# Patient Record
Sex: FEMALE | Race: WHITE | NOT HISPANIC OR LATINO | Employment: OTHER | URBAN - METROPOLITAN AREA
[De-identification: names, ages, dates, MRNs, and addresses within clinical notes are randomized per-mention and may not be internally consistent; named-entity substitution may affect disease eponyms.]

---

## 2017-04-08 ENCOUNTER — HOSPITAL ENCOUNTER (EMERGENCY)
Facility: HOSPITAL | Age: 82
Discharge: HOME | End: 2017-04-08
Attending: EMERGENCY MEDICINE | Admitting: EMERGENCY MEDICINE

## 2017-04-08 ENCOUNTER — APPOINTMENT (OUTPATIENT)
Dept: CT IMAGING | Facility: HOSPITAL | Age: 82
End: 2017-04-08

## 2017-04-08 ENCOUNTER — APPOINTMENT (OUTPATIENT)
Dept: GENERAL RADIOLOGY | Facility: HOSPITAL | Age: 82
End: 2017-04-08

## 2017-04-08 VITALS
RESPIRATION RATE: 18 BRPM | BODY MASS INDEX: 23.92 KG/M2 | DIASTOLIC BLOOD PRESSURE: 86 MMHG | OXYGEN SATURATION: 93 % | TEMPERATURE: 97.9 F | WEIGHT: 130 LBS | SYSTOLIC BLOOD PRESSURE: 149 MMHG | HEIGHT: 62 IN | HEART RATE: 79 BPM

## 2017-04-08 DIAGNOSIS — S09.90XA CLOSED HEAD INJURY, INITIAL ENCOUNTER: Primary | ICD-10-CM
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
## 2017-04-08 DIAGNOSIS — I50.9 CONGESTIVE HEART FAILURE, UNSPECIFIED CONGESTIVE HEART FAILURE CHRONICITY, UNSPECIFIED CONGESTIVE HEART FAILURE TYPE: ICD-10-CM

## 2017-04-08 DIAGNOSIS — I50.9 CHRONIC CONGESTIVE HEART FAILURE, UNSPECIFIED CONGESTIVE HEART FAILURE TYPE: ICD-10-CM

## 2017-04-08 LAB
ALBUMIN SERPL-MCNC: 4.4 G/DL (ref 3.2–4.8)
ALBUMIN/GLOB SERPL: 1.2 G/DL (ref 1.5–2.5)
ALP SERPL-CCNC: 90 U/L (ref 25–100)
ALT SERPL W P-5'-P-CCNC: 12 U/L (ref 7–40)
ANION GAP SERPL CALCULATED.3IONS-SCNC: 8 MMOL/L (ref 3–11)
APTT PPP: 29.7 SECONDS (ref 24–31)
AST SERPL-CCNC: 30 U/L (ref 0–33)
BACTERIA UR QL AUTO: NORMAL /HPF
BASOPHILS # BLD AUTO: 0.05 10*3/MM3 (ref 0–0.2)
BASOPHILS NFR BLD AUTO: 0.6 % (ref 0–1)
BILIRUB SERPL-MCNC: 0.6 MG/DL (ref 0.3–1.2)
BILIRUB UR QL STRIP: NEGATIVE
BNP SERPL-MCNC: 826 PG/ML (ref 0–100)
BUN BLD-MCNC: 16 MG/DL (ref 9–23)
BUN/CREAT SERPL: 17.8 (ref 7–25)
CALCIUM SPEC-SCNC: 10.1 MG/DL (ref 8.7–10.4)
CHLORIDE SERPL-SCNC: 106 MMOL/L (ref 99–109)
CLARITY UR: CLEAR
CO2 SERPL-SCNC: 27 MMOL/L (ref 20–31)
COLOR UR: YELLOW
CREAT BLD-MCNC: 0.9 MG/DL (ref 0.6–1.3)
DEPRECATED RDW RBC AUTO: 50.5 FL (ref 37–54)
EOSINOPHIL # BLD AUTO: 0.61 10*3/MM3 (ref 0.1–0.3)
EOSINOPHIL NFR BLD AUTO: 7.1 % (ref 0–3)
ERYTHROCYTE [DISTWIDTH] IN BLOOD BY AUTOMATED COUNT: 15.8 % (ref 11.3–14.5)
GFR SERPL CREATININE-BSD FRML MDRD: 59 ML/MIN/1.73
GLOBULIN UR ELPH-MCNC: 3.7 GM/DL
GLUCOSE BLD-MCNC: 83 MG/DL (ref 70–100)
GLUCOSE UR STRIP-MCNC: NEGATIVE MG/DL
HCT VFR BLD AUTO: 40.6 % (ref 34.5–44)
HGB BLD-MCNC: 13 G/DL (ref 11.5–15.5)
HGB UR QL STRIP.AUTO: ABNORMAL
HYALINE CASTS UR QL AUTO: NORMAL /LPF
IMM GRANULOCYTES # BLD: 0.03 10*3/MM3 (ref 0–0.03)
IMM GRANULOCYTES NFR BLD: 0.3 % (ref 0–0.6)
INR PPP: 1.09
KETONES UR QL STRIP: NEGATIVE
LEUKOCYTE ESTERASE UR QL STRIP.AUTO: NEGATIVE
LYMPHOCYTES # BLD AUTO: 1.12 10*3/MM3 (ref 0.6–4.8)
LYMPHOCYTES NFR BLD AUTO: 13 % (ref 24–44)
MCH RBC QN AUTO: 28.4 PG (ref 27–31)
MCHC RBC AUTO-ENTMCNC: 32 G/DL (ref 32–36)
MCV RBC AUTO: 88.6 FL (ref 80–99)
MONOCYTES # BLD AUTO: 1.16 10*3/MM3 (ref 0–1)
MONOCYTES NFR BLD AUTO: 13.5 % (ref 0–12)
NEUTROPHILS # BLD AUTO: 5.65 10*3/MM3 (ref 1.5–8.3)
NEUTROPHILS NFR BLD AUTO: 65.5 % (ref 41–71)
NITRITE UR QL STRIP: NEGATIVE
PH UR STRIP.AUTO: 5.5 [PH] (ref 5–8)
PLATELET # BLD AUTO: 337 10*3/MM3 (ref 150–450)
PMV BLD AUTO: 11.2 FL (ref 6–12)
POTASSIUM BLD-SCNC: 4.3 MMOL/L (ref 3.5–5.5)
PROT SERPL-MCNC: 8.1 G/DL (ref 5.7–8.2)
PROT UR QL STRIP: NEGATIVE
PROTHROMBIN TIME: 11.9 SECONDS (ref 9.6–11.5)
RBC # BLD AUTO: 4.58 10*6/MM3 (ref 3.89–5.14)
RBC # UR: NORMAL /HPF
REF LAB TEST METHOD: NORMAL
SODIUM BLD-SCNC: 141 MMOL/L (ref 132–146)
SP GR UR STRIP: 1.01 (ref 1–1.03)
SQUAMOUS #/AREA URNS HPF: NORMAL /HPF
UROBILINOGEN UR QL STRIP: ABNORMAL
WBC NRBC COR # BLD: 8.62 10*3/MM3 (ref 3.5–10.8)
WBC UR QL AUTO: NORMAL /HPF

## 2017-04-08 PROCEDURE — 85610 PROTHROMBIN TIME: CPT | Performed by: EMERGENCY MEDICINE

## 2017-04-08 PROCEDURE — 93005 ELECTROCARDIOGRAM TRACING: CPT | Performed by: EMERGENCY MEDICINE

## 2017-04-08 PROCEDURE — 80053 COMPREHEN METABOLIC PANEL: CPT | Performed by: EMERGENCY MEDICINE

## 2017-04-08 PROCEDURE — P9612 CATHETERIZE FOR URINE SPEC: HCPCS

## 2017-04-08 PROCEDURE — 83880 ASSAY OF NATRIURETIC PEPTIDE: CPT | Performed by: EMERGENCY MEDICINE

## 2017-04-08 PROCEDURE — 71010 HC CHEST PA OR AP: CPT

## 2017-04-08 PROCEDURE — 85730 THROMBOPLASTIN TIME PARTIAL: CPT | Performed by: EMERGENCY MEDICINE

## 2017-04-08 PROCEDURE — 99285 EMERGENCY DEPT VISIT HI MDM: CPT

## 2017-04-08 PROCEDURE — 81001 URINALYSIS AUTO W/SCOPE: CPT | Performed by: EMERGENCY MEDICINE

## 2017-04-08 PROCEDURE — 70450 CT HEAD/BRAIN W/O DYE: CPT

## 2017-04-08 PROCEDURE — 85025 COMPLETE CBC W/AUTO DIFF WBC: CPT | Performed by: EMERGENCY MEDICINE

## 2017-04-08 PROCEDURE — 94799 UNLISTED PULMONARY SVC/PX: CPT

## 2017-04-08 PROCEDURE — 36415 COLL VENOUS BLD VENIPUNCTURE: CPT

## 2017-04-08 PROCEDURE — 94640 AIRWAY INHALATION TREATMENT: CPT

## 2017-04-08 RX ORDER — LISINOPRIL 20 MG/1
20 TABLET ORAL DAILY
COMMUNITY
End: 2017-04-26 | Stop reason: SDUPTHER

## 2017-04-08 RX ORDER — DONEPEZIL HYDROCHLORIDE 10 MG/1
10 TABLET, FILM COATED ORAL NIGHTLY
COMMUNITY

## 2017-04-08 RX ORDER — ASPIRIN 81 MG/1
81 TABLET ORAL DAILY
COMMUNITY

## 2017-04-08 RX ORDER — AMLODIPINE BESYLATE 5 MG/1
2.5 TABLET ORAL DAILY
Status: ON HOLD | COMMUNITY
End: 2018-03-06

## 2017-04-08 RX ORDER — COLESEVELAM 180 1/1
1250 TABLET ORAL 2 TIMES DAILY WITH MEALS
Status: ON HOLD | COMMUNITY
End: 2018-03-06

## 2017-04-08 RX ORDER — IPRATROPIUM BROMIDE AND ALBUTEROL SULFATE 2.5; .5 MG/3ML; MG/3ML
3 SOLUTION RESPIRATORY (INHALATION) ONCE
Status: COMPLETED | OUTPATIENT
Start: 2017-04-08 | End: 2017-04-08

## 2017-04-08 RX ORDER — FUROSEMIDE 20 MG/1
20 TABLET ORAL DAILY
Qty: 10 TABLET | Refills: 0 | Status: SHIPPED | OUTPATIENT
Start: 2017-04-08 | End: 2017-04-18

## 2017-04-08 RX ORDER — POTASSIUM CHLORIDE 750 MG/1
10 TABLET, FILM COATED, EXTENDED RELEASE ORAL DAILY
Qty: 10 TABLET | Refills: 0 | Status: SHIPPED | OUTPATIENT
Start: 2017-04-08 | End: 2017-04-18

## 2017-04-08 RX ORDER — ALBUTEROL SULFATE 90 UG/1
2 AEROSOL, METERED RESPIRATORY (INHALATION) 2 TIMES DAILY
Status: ON HOLD | COMMUNITY
End: 2018-03-06

## 2017-04-08 RX ADMIN — IPRATROPIUM BROMIDE AND ALBUTEROL SULFATE 3 ML: .5; 3 SOLUTION RESPIRATORY (INHALATION) at 06:54

## 2017-04-08 NOTE — ED PROVIDER NOTES
Subjective   HPI Comments: Very pleasant 89-year-old female resident of [facility] presents to the emergency department after a slip and fall with blow to the left occiput    Patient reports she was walking and her socks today and slipped on the carpet.  She fell with blow to her left occiput and discomfort at the site when she puts pressure on it.  There is little pain when she does not have pressure on it.  She had no loss of consciousness.  She has no vomiting change in vision speech or cognition.  She denies any weakness.  She has no neck pain.    Patient denies cough congestion fevers chills or urinary symptoms chest or abdominal pain.  She had no palpitations    Patient has chronic shortness of breath which is unchanged, though she's gone on oxygen within the last month.  She has no URI symptoms.  Her evaluation for the shortness of breath was done a month ago at [facility].  She does not have a history of known recurrent falls.  She is on no anticoagulant medicine    Patient is a 89 y.o. female presenting with head injury.   History provided by:  Relative and patient  Head Injury   Location:  Occipital  Time since incident:  1 hour  Mechanism of injury: fall    Fall:     Fall occurred:  Tripped and walking    Impact surface:  Carpet    Point of impact:  Head    Entrapped after fall: no    Pain details:     Quality:  Dull    Severity:  Mild    Timing:  Constant    Progression:  Unable to specify  Chronicity:  New  Relieved by:  Rest  Worsened by:  Pressure  Ineffective treatments:  None tried  Associated symptoms: memory loss    Associated symptoms: no blurred vision, no disorientation, no focal weakness, no headaches, no hearing loss, no loss of consciousness, no nausea, no neck pain, no numbness and no vomiting    Risk factors: being elderly    Risk factors: no alcohol use        Review of Systems   Constitutional: Negative.  Negative for fever.   HENT: Negative.  Negative for hearing loss.    Eyes:  Negative.  Negative for blurred vision.   Respiratory: Negative.  Negative for cough.    Cardiovascular: Negative.  Negative for chest pain.   Gastrointestinal: Negative.  Negative for abdominal pain, blood in stool, nausea and vomiting.   Genitourinary: Negative.  Negative for dysuria and urgency.   Musculoskeletal: Negative for neck pain.   Skin: Negative.    Neurological: Negative.  Negative for focal weakness, loss of consciousness, syncope, weakness, numbness and headaches.   Psychiatric/Behavioral: Positive for memory loss. Negative for confusion.   All other systems reviewed and are negative.      Past Medical History:   Diagnosis Date   • Constipation    • Dementia    • Falls frequently    • Hypertension    • Osteoarthritis    • Sciatica    • Stroke        No Known Allergies    Past Surgical History:   Procedure Laterality Date   • APPENDECTOMY     • CAROTID ENDARTERECTOMY     • HYSTERECTOMY         History reviewed. No pertinent family history.    Social History     Social History   • Marital status: Single     Spouse name: N/A   • Number of children: N/A   • Years of education: N/A     Social History Main Topics   • Smoking status: Never Smoker   • Smokeless tobacco: None   • Alcohol use No   • Drug use: No   • Sexual activity: Defer     Other Topics Concern   • None     Social History Narrative   • None           Objective   Physical Exam   Constitutional: She appears well-developed and well-nourished. No distress.   HENT:   Head: Normocephalic.   Nose: Nose normal.   Mouth/Throat: Oropharynx is clear and moist.   Tenderness is small hematoma at the left occipital area without surrounding or palpable bony deformity.  The remainder of the face and scalp are unremarkable.  There is mild tenderness at the area of hematoma.   Eyes: Conjunctivae are normal.   Neck: Normal range of motion. Neck supple.   Range of motion without distracting injury without significant tenderness deformity or traumatic changes of  the cervical spine   Cardiovascular: Normal rate, regular rhythm, normal heart sounds and intact distal pulses.  Exam reveals no gallop and no friction rub.    No murmur heard.  Pulmonary/Chest: Effort normal and breath sounds normal. No respiratory distress. She has no wheezes. She has no rales. She exhibits no tenderness.   Abdominal: Soft. Bowel sounds are normal. She exhibits no distension. There is no tenderness.   Musculoskeletal: Normal range of motion. She exhibits edema.   Neurological: She is alert. She has normal reflexes. She displays normal reflexes. No cranial nerve deficit. She exhibits normal muscle tone. Coordination normal.   Skin: Skin is warm and dry.   2+ bilateral lower extremity edema with PVD changes at the lower extremity which are the patient's baseline per the grandson that here in the room   Psychiatric: She has a normal mood and affect. Her behavior is normal. Thought content normal.   Nursing note and vitals reviewed.      Procedures         ED Course  ED Course   Comment By Time   Patient remains stable throughout ED course to date.  I discussed findings including CHF on x-ray, which is consistent with the patient's progressively increasing shortness of breath requiring oxygen for the last month.  There's been no acute decompensation of this recently, though she has been oxygen dependent with significant dyspnea on exertion.  She has no known cardiac history per her grandson.I discussed findings today to the bedside with patient and grandson.  She remains alert and pleasant though does have dementia.  I discussed continued evaluation and treatment here. Manny Marino MD 04/08 0723   Patient is remained alert pleasant and cooperative nontoxic throughout the ED course to date.  CT the head was read as no acute changes.  Her x-ray is consistent with congestive heart failure, consistent with her progression of shortness of breath over the last couple months along with lower extremity  edema, requiring oxygen for the last month.  There is again no acute chest pain, dictations for significant acute change in shortness of breath acutely.I discussed options for care including CHF clinic, and patient and family are in agreement with evaluation of the [facility] CHF clinic.  I discussed diuresis, and in view of the patient's right [facility] we'll treat her when she gets there with Lasix to facilitate her transport back without difficulty associated with starting her diuretic here, as this is a chronic problem without acute changeI discussed her closed head injury, closed head injury precautions, and indications for immediate return.  Urinalysis is unremarkableVery pleasant patient and caring grandson verbalizes understanding and agreement with the plan of care. Manny Marino MD 04/08 2852                  [facility]    Final diagnoses:   Closed head injury, initial encounter   Fall, initial encounter   Chronic congestive heart failure, unspecified congestive heart failure type   Congestive heart failure, unspecified congestive heart failure chronicity, unspecified congestive heart failure type            Manny Marino MD  04/08/17 [id number]

## 2017-04-18 ENCOUNTER — OFFICE VISIT (OUTPATIENT)
Dept: CARDIOLOGY | Facility: HOSPITAL | Age: 82
End: 2017-04-18

## 2017-04-18 VITALS
TEMPERATURE: 99 F | HEART RATE: 70 BPM | HEIGHT: 56 IN | BODY MASS INDEX: 30.82 KG/M2 | WEIGHT: 137 LBS | RESPIRATION RATE: 18 BRPM | SYSTOLIC BLOOD PRESSURE: 83 MMHG | OXYGEN SATURATION: 95 % | DIASTOLIC BLOOD PRESSURE: 55 MMHG

## 2017-04-18 DIAGNOSIS — I10 ESSENTIAL HYPERTENSION: ICD-10-CM

## 2017-04-18 DIAGNOSIS — I77.9 CAROTID ARTERY DISEASE, UNSPECIFIED LATERALITY (HCC): ICD-10-CM

## 2017-04-18 DIAGNOSIS — R06.02 SHORTNESS OF BREATH: Primary | ICD-10-CM

## 2017-04-18 DIAGNOSIS — R29.6 FALLS FREQUENTLY: ICD-10-CM

## 2017-04-18 DIAGNOSIS — Z86.73 HISTORY OF CVA (CEREBROVASCULAR ACCIDENT): ICD-10-CM

## 2017-04-18 DIAGNOSIS — I95.2 HYPOTENSION DUE TO DRUGS: ICD-10-CM

## 2017-04-18 LAB
ANION GAP SERPL CALCULATED.3IONS-SCNC: 5 MMOL/L (ref 3–11)
BUN BLD-MCNC: 37 MG/DL (ref 9–23)
BUN/CREAT SERPL: 37 (ref 7–25)
CALCIUM SPEC-SCNC: 9.6 MG/DL (ref 8.7–10.4)
CHLORIDE SERPL-SCNC: 106 MMOL/L (ref 99–109)
CO2 SERPL-SCNC: 30 MMOL/L (ref 20–31)
CREAT BLD-MCNC: 1 MG/DL (ref 0.6–1.3)
GFR SERPL CREATININE-BSD FRML MDRD: 52 ML/MIN/1.73
GLUCOSE BLD-MCNC: 82 MG/DL (ref 70–100)
POTASSIUM BLD-SCNC: 4.7 MMOL/L (ref 3.5–5.5)
SODIUM BLD-SCNC: 141 MMOL/L (ref 132–146)

## 2017-04-18 PROCEDURE — 80048 BASIC METABOLIC PNL TOTAL CA: CPT | Performed by: NURSE PRACTITIONER

## 2017-04-18 PROCEDURE — 99215 OFFICE O/P EST HI 40 MIN: CPT | Performed by: NURSE PRACTITIONER

## 2017-04-18 NOTE — PATIENT INSTRUCTIONS
You will be called to schedule the carotid and heart ultrasound    Follow up will be scheduled after results are viewed.

## 2017-04-18 NOTE — PROGRESS NOTES
[facility]  Heart and Valve Center      Encounter Date:04/18/2017     Ana Cleary  [address] [facility] [address]  [phone]    7/3/1926    Kasey Bethea MD    Ana Cleary is a 89 y.o. female.      Subjective:     Chief Complaint:  Establish Care (chf)       HPI     Very pleasant 89-year-old female resident of [facility] presented to [facility] 04/8/07  after a slip and fall with blow to the left occiput.  Family reports she has had multiple falls over the last few months.    Patient reported she was walking in her socks  and slipped on the carpet. She fell with blow to her left occiput and had discomfort at the site with pressure, which has now resolved. She did not loose  consciousness. She had no vomiting change in vision speech or cognition.    Pt was also complaining of SOA for last month that progressed to needing oxygent daily.  XR completed and felt to be mild CHF.  She was given Lasix 20 mg and KCL 10meq.  Pt denies that dyspnea has improved, but her family present today feels that she looks more comfortable.  She denies CP, pressure, palpitations, dizziness, syncope.  Reports mild edema that did not change with Lasix.  She have been very tire, not wanting to participate in activities.       Reports a hx of carotid stenosis s/p CEA many years ago.  Reports a remote stress test possible [facility] while in [place], no stents, MI.  Medical records mentions a hx of stroke, pt and family are not sure if that is accurate.  Has not seen a cardiologist in many years.       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   Hypertension   Carotid Artery Disease   Dementia   Falls Frequently       Past Surgical History:   Procedure Laterality Date   • APPENDECTOMY     • CAROTID ENDARTERECTOMY Left    • HYSTERECTOMY         No Known Allergies      Current Outpatient Prescriptions:   •  albuterol (PROVENTIL HFA;VENTOLIN HFA) 108 (90 BASE) MCG/ACT inhaler, Inhale 2 puffs 2 (Two) Times a Day., Disp: , Rfl:   •   amLODIPine (NORVASC) 5 MG tablet, Take 5 mg by mouth Daily., Disp: , Rfl:   •  aspirin 81 MG EC tablet, Take 81 mg by mouth Daily., Disp: , Rfl:   •  carbidopa-levodopa (SINEMET)  MG per tablet, Take 1 tablet by mouth 3 (Three) Times a Day., Disp: , Rfl:   •  colesevelam (WELCHOL) 625 MG tablet, Take 1,250 mg by mouth 2 (Two) Times a Day With Meals., Disp: , Rfl:   •  donepezil (ARICEPT) 10 MG tablet, Take 10 mg by mouth Every Night., Disp: , Rfl:   •  furosemide (LASIX) 20 MG tablet, Take 1 tablet by mouth Daily., Disp: 10 tablet, Rfl: 0  •  lisinopril (PRINIVIL,ZESTRIL) 20 MG tablet, Take 20 mg by mouth Daily., Disp: , Rfl:   •  mometasone-formoterol (DULERA 100) 100-5 MCG/ACT inhaler, Inhale 2 puffs 2 (Two) Times a Day., Disp: , Rfl:   •  Multiple Vitamins-Minerals (MULTIVITAMIN ADULT PO), Take 1 tablet by mouth Daily., Disp: , Rfl:   •  potassium chloride (K-DUR) 10 MEQ CR tablet, Take 1 tablet by mouth Daily., Disp: 10 tablet, Rfl: 0  •  sertraline (ZOLOFT) 50 MG tablet, Take 50 mg by mouth Daily., Disp: , Rfl:     The following portions of the patient's history were reviewed and updated as appropriate: allergies, current medications, past family history, past medical history, past social history, past surgical history and problem list.    Review of Systems   Constitution: Positive for weight gain. Negative for chills, decreased appetite, diaphoresis, fever, weakness, malaise/fatigue, night sweats and weight loss.   HENT: Negative.  Negative for congestion, headaches and nosebleeds.    Eyes: Negative.  Negative for blurred vision, visual disturbance and visual halos.   Cardiovascular: Positive for dyspnea on exertion. Negative for chest pain, claudication, cyanosis, irregular heartbeat, leg swelling, near-syncope, orthopnea, palpitations, paroxysmal nocturnal dyspnea and syncope.   Respiratory: Negative for cough, hemoptysis, shortness of breath, sleep disturbances due to breathing, snoring (light),  "sputum production and wheezing.    Endocrine: Negative.  Negative for cold intolerance, heat intolerance, polydipsia, polyphagia and polyuria.   Hematologic/Lymphatic: Bruises/bleeds easily.   Skin: Negative.  Negative for dry skin, itching and rash.   Musculoskeletal: Positive for falls. Negative for joint pain, joint swelling, muscle weakness and myalgias.   Gastrointestinal: Positive for bloating. Negative for abdominal pain, constipation, diarrhea, dysphagia, heartburn, melena, nausea and vomiting.   Genitourinary: Negative for dysuria, flank pain, hematuria and nocturia.   Neurological: Positive for light-headedness and loss of balance. Negative for difficulty with concentration, excessive daytime sleepiness and dizziness.   Psychiatric/Behavioral: Positive for memory loss. Negative for altered mental status and depression. The patient is nervous/anxious.    Allergic/Immunologic: Positive for environmental allergies.       Objective:     Vitals:    04/18/17 1126 04/18/17 1128   BP: 95/56 (!) 83/55   BP Location: Right arm Left arm   Patient Position: Sitting Sitting   Pulse: 73 70   Resp: 18    Temp: 99 °F (37.2 °C)    TempSrc: Temporal Artery     SpO2: 95%    Weight: 137 lb (62.1 kg)    Height: 56\" (142.2 cm)          Physical Exam   Constitutional: She is oriented to person, place, and time. She appears well-developed and well-nourished. No distress.   HENT:   Head: Normocephalic and atraumatic.   Mouth/Throat: Oropharynx is clear and moist.   Eyes: Conjunctivae are normal. Pupils are equal, round, and reactive to light. No scleral icterus.   Neck: No hepatojugular reflux and no JVD present. Carotid bruit is not present. No tracheal deviation present. No thyromegaly present.   Cardiovascular: Normal rate, regular rhythm, normal heart sounds and intact distal pulses.  Exam reveals no friction rub.    No murmur heard.  Pulmonary/Chest: Effort normal and breath sounds normal.   Abdominal: Soft. Bowel sounds are " normal. She exhibits no distension. There is no tenderness.   Musculoskeletal: She exhibits no edema.   Lymphadenopathy:     She has no cervical adenopathy.   Neurological: She is alert and oriented to person, place, and time.   Skin: Skin is warm, dry and intact. No rash noted. No cyanosis or erythema. No pallor.   Psychiatric: She has a normal mood and affect. Her behavior is normal. Thought content normal.   Vitals reviewed.      Lab and Diagnostic Review:  Admission on 04/08/2017, Discharged on 04/08/2017   Component Date Value Ref Range Status   • Glucose 04/08/2017 83  70 - 100 mg/dL Final   • BUN 04/08/2017 16  9 - 23 mg/dL Final   • Creatinine 04/08/2017 0.90  0.60 - 1.30 mg/dL Final   • Sodium 04/08/2017 141  132 - 146 mmol/L Final   • Potassium 04/08/2017 4.3  3.5 - 5.5 mmol/L Final   • Chloride 04/08/2017 106  99 - 109 mmol/L Final   • CO2 04/08/2017 27.0  20.0 - 31.0 mmol/L Final   • Calcium 04/08/2017 10.1  8.7 - 10.4 mg/dL Final   • Total Protein 04/08/2017 8.1  5.7 - 8.2 g/dL Final   • Albumin 04/08/2017 4.40  3.20 - 4.80 g/dL Final   • ALT (SGPT) 04/08/2017 12  7 - 40 U/L Final   • AST (SGOT) 04/08/2017 30  0 - 33 U/L Final   • Alkaline Phosphatase 04/08/2017 90  25 - 100 U/L Final   • Total Bilirubin 04/08/2017 0.6  0.3 - 1.2 mg/dL Final   • eGFR Non African Amer 04/08/2017 59* >60 mL/min/1.73 Final   • Globulin 04/08/2017 3.7  gm/dL Final   • A/G Ratio 04/08/2017 1.2* 1.5 - 2.5 g/dL Final   • BUN/Creatinine Ratio 04/08/2017 17.8  7.0 - 25.0 Final   • Anion Gap 04/08/2017 8.0  3.0 - 11.0 mmol/L Final   • Protime 04/08/2017 11.9* 9.6 - 11.5 Seconds Final   • INR 04/08/2017 1.09   Final   • PTT 04/08/2017 29.7  24.0 - 31.0 seconds Final   • Color, UA 04/08/2017 Yellow  Yellow, Straw Final   • Appearance, UA 04/08/2017 Clear  Clear Final   • pH, UA 04/08/2017 5.5  5.0 - 8.0 Final   • Specific Gravity, UA 04/08/2017 1.008  1.001 - 1.030 Final   • Glucose, UA 04/08/2017 Negative  Negative Final   •  Ketones, UA 04/08/2017 Negative  Negative Final   • Bilirubin, UA 04/08/2017 Negative  Negative Final   • Blood, UA 04/08/2017 Trace* Negative Final   • Protein, UA 04/08/2017 Negative  Negative Final   • Leuk Esterase, UA 04/08/2017 Negative  Negative Final   • Nitrite, UA 04/08/2017 Negative  Negative Final   • Urobilinogen, UA 04/08/2017 0.2 E.U./dL  0.2 - 1.0 E.U./dL Final   • WBC 04/08/2017 8.62  3.50 - 10.80 10*3/mm3 Final   • RBC 04/08/2017 4.58  3.89 - 5.14 10*6/mm3 Final   • Hemoglobin 04/08/2017 13.0  11.5 - 15.5 g/dL Final   • Hematocrit 04/08/2017 40.6  34.5 - 44.0 % Final   • MCV 04/08/2017 88.6  80.0 - 99.0 fL Final   • MCH 04/08/2017 28.4  27.0 - 31.0 pg Final   • MCHC 04/08/2017 32.0  32.0 - 36.0 g/dL Final   • RDW 04/08/2017 15.8* 11.3 - 14.5 % Final   • RDW-SD 04/08/2017 50.5  37.0 - 54.0 fl Final   • MPV 04/08/2017 11.2  6.0 - 12.0 fL Final   • Platelets 04/08/2017 337  150 - 450 10*3/mm3 Final   • Neutrophil % 04/08/2017 65.5  41.0 - 71.0 % Final   • Lymphocyte % 04/08/2017 13.0* 24.0 - 44.0 % Final   • Monocyte % 04/08/2017 13.5* 0.0 - 12.0 % Final   • Eosinophil % 04/08/2017 7.1* 0.0 - 3.0 % Final   • Basophil % 04/08/2017 0.6  0.0 - 1.0 % Final   • Immature Grans % 04/08/2017 0.3  0.0 - 0.6 % Final   • Neutrophils, Absolute 04/08/2017 5.65  1.50 - 8.30 10*3/mm3 Final   • Lymphocytes, Absolute 04/08/2017 1.12  0.60 - 4.80 10*3/mm3 Final   • Monocytes, Absolute 04/08/2017 1.16* 0.00 - 1.00 10*3/mm3 Final   • Eosinophils, Absolute 04/08/2017 0.61* 0.10 - 0.30 10*3/mm3 Final   • Basophils, Absolute 04/08/2017 0.05  0.00 - 0.20 10*3/mm3 Final   • Immature Grans, Absolute 04/08/2017 0.03  0.00 - 0.03 10*3/mm3 Final   • BNP 04/08/2017 826.0* 0.0 - 100.0 pg/mL Final   • RBC, UA 04/08/2017 0-2  None Seen, 0-2 /HPF Final   • WBC, UA 04/08/2017 None Seen  None Seen /HPF Final   • Bacteria, UA 04/08/2017 None Seen  None Seen, Trace /HPF Final   • Squamous Epithelial Cells, UA 04/08/2017 None Seen  None  Seen, 0-2 /HPF Final   • Hyaline Casts, UA 04/08/2017 None Seen  0 - 6 /LPF Final   • Methodology 04/08/2017 Automated Microscopy   Final     Results for orders placed or performed in visit on 04/18/17   Basic Metabolic Panel   Result Value Ref Range    Glucose 82 70 - 100 mg/dL    BUN 37 (H) 9 - 23 mg/dL    Creatinine 1.00 0.60 - 1.30 mg/dL    Sodium 141 132 - 146 mmol/L    Potassium 4.7 3.5 - 5.5 mmol/L    Chloride 106 99 - 109 mmol/L    CO2 30.0 20.0 - 31.0 mmol/L    Calcium 9.6 8.7 - 10.4 mg/dL    eGFR Non African Amer 52 (L) >60 mL/min/1.73    BUN/Creatinine Ratio 37.0 (H) 7.0 - 25.0    Anion Gap 5.0 3.0 - 11.0 mmol/L     EXAMINATION: XR CHEST, SINGLE VIEW - 04/08/2017      INDICATION: Wheezing, shortness of air.      COMPARISON: None.      FINDINGS: The heart is enlarged. There is diffuse mostly reticular  interstitial disease with granulomatous calcifications. There appears to  be diffuse peribronchial thickening, and, accordingly, viral  syndrome/bronchitis is a consideration as well as pulmonary edema and  pulmonary fibrosis. No lung consolidation, effusion, or pneumothorax is  seen.       IMPRESSION:  Cardiomegaly and diffuse interstitial disease. Potentially  early interstitial edema. Bronchitis might appear similar.      ER CODE: A      DICTATED: 04/08/2017  EDITED: 04/08/2017      This report was finalized on 4/8/2017 11:59 PM by DR. Simon Segura MD    EXAM:  CT Head Without Intravenous Contrast     CLINICAL HISTORY:  89 years old, female; Injury or trauma; Fall; Initial encounter; Blunt trauma   (contusions or hematomas); Additional info: [facility]     TECHNIQUE:  Axial computed tomography images of the head/brain without intravenous   contrast. This CT exam was performed using one or more of the following dose   reduction techniques: automated exposure control, adjustment of the mA and/or   kV according to patient size, and/or use of iterative reconstruction technique.     COMPARISON:  No relevant prior  studies available.     FINDINGS:  Bilateral periventricular lucencies with old infarcts without   intraparenchymal hemorrhage and no obvious acute ischemic stroke. No intra-or   extra-axial fluid collection, no supra-or infratentorial mass, no mass effect   or midline shift.      Mildly dilated ventricles, sulci and basal cisterns without evidence of   hydrocephalus. Skull bones are normal. Mild mucoperiosteal thickening in the   paranasal sinuses. No evidence of mastoid effusion. Large LEFT posterior high   parietal scalp hematoma. Extensive intracranial vascular calcifications.     IMPRESSION:  Large LEFT posterior high parietal scalp hematoma, chronic microvascular   disease and generalized atrophy without acute intracranial abnormality.      No evidence of acute hemorrhage, mass lesion or obvious acute ischemic   Infarction.    Test Reason : WHEEZING, SOA  Blood Pressure : **/** mmHG  Vent. Rate : 081 BPM     Atrial Rate : 047 BPM     P-R Int : 134 ms          QRS Dur : 086 ms      QT Int : 438 ms       P-R-T Axes : 029 091 005 degrees     QTc Int : 508 ms    Normal sinus rhythm with frequent premature ventricular complexes  Nonspecific ST abnormality  Prolonged QT  Abnormal ECG  No previous ECGs available  Confirmed by JOHANN CURTIS, HOLIS ([id number]) on 4/8/2017 6:51:58 AM  Assessment and Plan:         1. Shortness of breath    - Basic Metabolic Panel  - Adult Transthoracic Echo Complete; Future    2. Essential hypertension      3. Hypotension due to drugs  BP low today.  D/c Lasix and KCL    Keep daily BP log for 2 weeks  Weigh daily and monitor for s/s HF worsening (dyspnea, weight gain, edema)    4. Carotid artery disease, unspecified laterality    - Duplex Carotid Ultrasound CAR; Future    5. Falls frequently      6. History of CVA (cerebrovascular accident)    Will schedule f/u with cardiology pending results.          *Please note that portions of this note were completed with a voice recognition program. Efforts  were made to edit the dictations, but occasionally words are mistranscribed.

## 2017-04-26 RX ORDER — FUROSEMIDE 20 MG/1
20 TABLET ORAL DAILY
Qty: 30 TABLET | Refills: 3 | Status: SHIPPED | OUTPATIENT
Start: 2017-04-26

## 2017-04-26 RX ORDER — LISINOPRIL 10 MG/1
10 TABLET ORAL DAILY
Qty: 30 TABLET | Refills: 3 | Status: SHIPPED | OUTPATIENT
Start: 2017-04-26 | End: 2017-09-19 | Stop reason: SDUPTHER

## 2017-04-26 RX ORDER — POTASSIUM CHLORIDE 750 MG/1
10 TABLET, FILM COATED, EXTENDED RELEASE ORAL DAILY
Qty: 30 TABLET | Refills: 3 | Status: ON HOLD | OUTPATIENT
Start: 2017-04-26 | End: 2018-03-06

## 2017-04-26 NOTE — PROGRESS NOTES
Representative from [facility] Demian (Debra) called to report 7 lb weigh gain since 04/22/17.  She had dyspnea, but no worsening edema.  Pt is on NC 2L continuous (not a new finding).  Pt tends to remove through out the day.      BP today 149/70    Will restart Lasix 20 mg daily, KCL 10 meq daily.  Repeat BmP 2 weeks.  Decrease Lisinopril 10 mg daily    Continue daily BP and weight log.    Reports that daughter cancelled scheduled echo and carotid (pt refuses to have completed)    Pt will f/u 6 week  Or sooner if needed.

## 2017-04-27 ENCOUNTER — APPOINTMENT (OUTPATIENT)
Dept: CARDIOLOGY | Facility: HOSPITAL | Age: 82
End: 2017-04-27

## 2017-05-01 DIAGNOSIS — Z79.899 ON POTASSIUM WASTING DIURETIC THERAPY: ICD-10-CM

## 2017-05-01 DIAGNOSIS — I10 ESSENTIAL HYPERTENSION: Primary | ICD-10-CM

## 2017-05-01 DIAGNOSIS — R06.02 SHORTNESS OF BREATH: ICD-10-CM

## 2017-05-05 ENCOUNTER — DOCUMENTATION (OUTPATIENT)
Dept: CARDIOLOGY | Facility: HOSPITAL | Age: 82
End: 2017-05-05

## 2017-05-15 ENCOUNTER — DOCUMENTATION (OUTPATIENT)
Dept: CARDIOLOGY | Facility: HOSPITAL | Age: 82
End: 2017-05-15

## 2017-05-18 ENCOUNTER — TELEPHONE (OUTPATIENT)
Dept: CARDIOLOGY | Facility: HOSPITAL | Age: 82
End: 2017-05-18

## 2017-08-25 ENCOUNTER — TRANSCRIBE ORDERS (OUTPATIENT)
Dept: ADMINISTRATIVE | Facility: HOSPITAL | Age: 82
End: 2017-08-25

## 2017-08-25 DIAGNOSIS — R91.8 OTHER NONSPECIFIC ABNORMAL FINDING OF LUNG FIELD: Primary | ICD-10-CM

## 2017-08-29 ENCOUNTER — APPOINTMENT (OUTPATIENT)
Dept: CT IMAGING | Facility: HOSPITAL | Age: 82
End: 2017-08-29

## 2017-08-31 ENCOUNTER — HOSPITAL ENCOUNTER (OUTPATIENT)
Dept: CT IMAGING | Facility: HOSPITAL | Age: 82
Discharge: HOME | End: 2017-08-31
Admitting: NURSE PRACTITIONER

## 2017-08-31 DIAGNOSIS — R91.8 OTHER NONSPECIFIC ABNORMAL FINDING OF LUNG FIELD: ICD-10-CM

## 2017-08-31 PROCEDURE — 0 IOPAMIDOL 61 % SOLUTION: Performed by: NURSE PRACTITIONER

## 2017-08-31 PROCEDURE — 82565 ASSAY OF CREATININE: CPT

## 2017-08-31 PROCEDURE — 71270 CT THORAX DX C-/C+: CPT

## 2017-08-31 RX ADMIN — IOPAMIDOL 60 ML: 612 INJECTION, SOLUTION INTRAVENOUS at 11:22

## 2017-09-01 LAB — CREAT BLDA-MCNC: 1.1 MG/DL (ref 0.6–1.3)

## 2017-09-07 ENCOUNTER — APPOINTMENT (OUTPATIENT)
Dept: CT IMAGING | Facility: HOSPITAL | Age: 82
End: 2017-09-07

## 2017-09-20 RX ORDER — POTASSIUM CHLORIDE 750 MG/1
TABLET, FILM COATED, EXTENDED RELEASE ORAL
Qty: 30 TABLET | Refills: 0 | Status: ON HOLD | OUTPATIENT
Start: 2017-09-20 | End: 2018-03-06

## 2017-09-20 RX ORDER — LISINOPRIL 10 MG/1
TABLET ORAL
Qty: 30 TABLET | Refills: 0 | Status: SHIPPED | OUTPATIENT
Start: 2017-09-20

## 2017-09-20 NOTE — TELEPHONE ENCOUNTER
Patient last seen 4/18/17 and is requesting refills for potassium chloride 10 mEq daily and lisinopril 10 mg daily. It was documented in the most recent office visit that patient was to discontinue furosemide and potassium. I spoke with the nurse at [facility] who stated that the patient is taking furosemide 40 mg daily, potassium chloride 10 mEq daily, and lisinopril 10 mg daily. Will send prescriptions for lisinopril and potassium chloride with no refills to [facility] in [place]. Nurse at [facility] is aware that patient needs to follow up with PCP or cardiologist for further refills and to have labs drawn.    Sharmin Fitzgerald, PharmD  Pharmacy Resident

## 2017-12-12 RX ORDER — POTASSIUM CHLORIDE 750 MG/1
TABLET, FILM COATED, EXTENDED RELEASE ORAL
Qty: 30 TABLET | Refills: 1 | OUTPATIENT
Start: 2017-12-12

## 2017-12-12 RX ORDER — LISINOPRIL 10 MG/1
TABLET ORAL
Qty: 30 TABLET | Refills: 1 | OUTPATIENT
Start: 2017-12-12

## 2017-12-12 NOTE — TELEPHONE ENCOUNTER
Patient last seen on 4/18/17 and has not followed up in the clinic or had labs drawn. On 9/19/17 a refill for lisinopril and potassium was sent to to [facility] in [place].  At that time the nurse at [facility] was made aware that patient needs to follow up with PCP or cardiologist for further refills and to have labs drawn. Refill request denied today for lisinopril and potassium.    Marielena Coombs, DesiraeD

## 2018-01-18 ENCOUNTER — LAB REQUISITION (OUTPATIENT)
Dept: LAB | Facility: HOSPITAL | Age: 83
End: 2018-01-18

## 2018-01-18 DIAGNOSIS — Z00.00 ROUTINE GENERAL MEDICAL EXAMINATION AT A HEALTH CARE FACILITY: ICD-10-CM

## 2018-01-18 LAB
ALBUMIN SERPL-MCNC: 3.9 G/DL (ref 3.2–4.8)
ALBUMIN/GLOB SERPL: 1.2 G/DL (ref 1.5–2.5)
ALP SERPL-CCNC: 77 U/L (ref 25–100)
ALT SERPL W P-5'-P-CCNC: 4 U/L (ref 7–40)
ANION GAP SERPL CALCULATED.3IONS-SCNC: 12 MMOL/L (ref 3–11)
AST SERPL-CCNC: 17 U/L (ref 0–33)
BACTERIA UR QL AUTO: ABNORMAL /HPF
BASOPHILS # BLD AUTO: 0.03 10*3/MM3 (ref 0–0.2)
BASOPHILS NFR BLD AUTO: 0.2 % (ref 0–1)
BILIRUB SERPL-MCNC: 0.3 MG/DL (ref 0.3–1.2)
BILIRUB UR QL STRIP: NEGATIVE
BUN BLD-MCNC: 35 MG/DL (ref 9–23)
BUN/CREAT SERPL: 29.2 (ref 7–25)
CALCIUM SPEC-SCNC: 8.7 MG/DL (ref 8.7–10.4)
CHLORIDE SERPL-SCNC: 102 MMOL/L (ref 99–109)
CLARITY UR: ABNORMAL
CO2 SERPL-SCNC: 22 MMOL/L (ref 20–31)
COLOR UR: YELLOW
CREAT BLD-MCNC: 1.2 MG/DL (ref 0.6–1.3)
DEPRECATED RDW RBC AUTO: 51.9 FL (ref 37–54)
EOSINOPHIL # BLD AUTO: 0.19 10*3/MM3 (ref 0–0.3)
EOSINOPHIL NFR BLD AUTO: 1.4 % (ref 0–3)
ERYTHROCYTE [DISTWIDTH] IN BLOOD BY AUTOMATED COUNT: 15.3 % (ref 11.3–14.5)
GFR SERPL CREATININE-BSD FRML MDRD: 42 ML/MIN/1.73
GLOBULIN UR ELPH-MCNC: 3.2 GM/DL
GLUCOSE BLD-MCNC: 96 MG/DL (ref 70–100)
GLUCOSE UR STRIP-MCNC: NEGATIVE MG/DL
HCT VFR BLD AUTO: 38.9 % (ref 34.5–44)
HGB BLD-MCNC: 11.7 G/DL (ref 11.5–15.5)
HGB UR QL STRIP.AUTO: ABNORMAL
HYALINE CASTS UR QL AUTO: ABNORMAL /LPF
IMM GRANULOCYTES # BLD: 0.11 10*3/MM3 (ref 0–0.03)
IMM GRANULOCYTES NFR BLD: 0.8 % (ref 0–0.6)
KETONES UR QL STRIP: NEGATIVE
LEUKOCYTE ESTERASE UR QL STRIP.AUTO: ABNORMAL
LYMPHOCYTES # BLD AUTO: 1.45 10*3/MM3 (ref 0.6–4.8)
LYMPHOCYTES NFR BLD AUTO: 10.8 % (ref 24–44)
MCH RBC QN AUTO: 28 PG (ref 27–31)
MCHC RBC AUTO-ENTMCNC: 30.1 G/DL (ref 32–36)
MCV RBC AUTO: 93.1 FL (ref 80–99)
MONOCYTES # BLD AUTO: 1.48 10*3/MM3 (ref 0–1)
MONOCYTES NFR BLD AUTO: 11 % (ref 0–12)
NEUTROPHILS # BLD AUTO: 10.15 10*3/MM3 (ref 1.5–8.3)
NEUTROPHILS NFR BLD AUTO: 75.8 % (ref 41–71)
NITRITE UR QL STRIP: NEGATIVE
PH UR STRIP.AUTO: 5.5 [PH] (ref 5–8)
PLATELET # BLD AUTO: 346 10*3/MM3 (ref 150–450)
PMV BLD AUTO: 12 FL (ref 6–12)
POTASSIUM BLD-SCNC: 4.9 MMOL/L (ref 3.5–5.5)
PROT SERPL-MCNC: 7.1 G/DL (ref 5.7–8.2)
PROT UR QL STRIP: ABNORMAL
RBC # BLD AUTO: 4.18 10*6/MM3 (ref 3.89–5.14)
RBC # UR: ABNORMAL /HPF
REF LAB TEST METHOD: ABNORMAL
SODIUM BLD-SCNC: 136 MMOL/L (ref 132–146)
SP GR UR STRIP: 1.01 (ref 1–1.03)
SQUAMOUS #/AREA URNS HPF: ABNORMAL /HPF
UROBILINOGEN UR QL STRIP: ABNORMAL
WBC NRBC COR # BLD: 13.41 10*3/MM3 (ref 3.5–10.8)
WBC UR QL AUTO: ABNORMAL /HPF

## 2018-01-18 PROCEDURE — 85025 COMPLETE CBC W/AUTO DIFF WBC: CPT

## 2018-01-18 PROCEDURE — 80053 COMPREHEN METABOLIC PANEL: CPT

## 2018-01-18 PROCEDURE — 81001 URINALYSIS AUTO W/SCOPE: CPT

## 2018-02-21 ENCOUNTER — HOSPITAL ENCOUNTER (EMERGENCY)
Facility: HOSPITAL | Age: 83
Discharge: NURSING, REHAB OR LONG-TERM CARE | End: 2018-02-22
Attending: EMERGENCY MEDICINE | Admitting: EMERGENCY MEDICINE

## 2018-02-21 ENCOUNTER — APPOINTMENT (OUTPATIENT)
Dept: GENERAL RADIOLOGY | Facility: HOSPITAL | Age: 83
End: 2018-02-21

## 2018-02-21 DIAGNOSIS — E86.0 DEHYDRATION: ICD-10-CM

## 2018-02-21 DIAGNOSIS — N28.9 ACUTE RENAL INSUFFICIENCY: ICD-10-CM

## 2018-02-21 DIAGNOSIS — N30.01 ACUTE CYSTITIS WITH HEMATURIA: Primary | ICD-10-CM

## 2018-02-21 LAB
ALBUMIN SERPL-MCNC: 4.1 G/DL (ref 3.2–4.8)
ALBUMIN/GLOB SERPL: 1.2 G/DL (ref 1.5–2.5)
ALP SERPL-CCNC: 77 U/L (ref 25–100)
ALT SERPL W P-5'-P-CCNC: 4 U/L (ref 7–40)
ANION GAP SERPL CALCULATED.3IONS-SCNC: 9 MMOL/L (ref 3–11)
AST SERPL-CCNC: 14 U/L (ref 0–33)
BACTERIA UR QL AUTO: ABNORMAL /HPF
BASOPHILS # BLD AUTO: 0.02 10*3/MM3 (ref 0–0.2)
BASOPHILS NFR BLD AUTO: 0.1 % (ref 0–1)
BILIRUB SERPL-MCNC: 0.4 MG/DL (ref 0.3–1.2)
BILIRUB UR QL STRIP: NEGATIVE
BUN BLD-MCNC: 37 MG/DL (ref 9–23)
BUN/CREAT SERPL: 20.6 (ref 7–25)
CALCIUM SPEC-SCNC: 9.1 MG/DL (ref 8.7–10.4)
CHLORIDE SERPL-SCNC: 100 MMOL/L (ref 99–109)
CLARITY UR: ABNORMAL
CO2 SERPL-SCNC: 26 MMOL/L (ref 20–31)
COLOR UR: YELLOW
CREAT BLD-MCNC: 1.8 MG/DL (ref 0.6–1.3)
DEPRECATED RDW RBC AUTO: 53.5 FL (ref 37–54)
EOSINOPHIL # BLD AUTO: 0.07 10*3/MM3 (ref 0–0.3)
EOSINOPHIL NFR BLD AUTO: 0.5 % (ref 0–3)
ERYTHROCYTE [DISTWIDTH] IN BLOOD BY AUTOMATED COUNT: 16 % (ref 11.3–14.5)
GFR SERPL CREATININE-BSD FRML MDRD: 26 ML/MIN/1.73
GLOBULIN UR ELPH-MCNC: 3.3 GM/DL
GLUCOSE BLD-MCNC: 120 MG/DL (ref 70–100)
GLUCOSE UR STRIP-MCNC: NEGATIVE MG/DL
HCT VFR BLD AUTO: 39.3 % (ref 34.5–44)
HGB BLD-MCNC: 12.5 G/DL (ref 11.5–15.5)
HGB UR QL STRIP.AUTO: ABNORMAL
HYALINE CASTS UR QL AUTO: ABNORMAL /LPF
IMM GRANULOCYTES # BLD: 0.03 10*3/MM3 (ref 0–0.03)
IMM GRANULOCYTES NFR BLD: 0.2 % (ref 0–0.6)
KETONES UR QL STRIP: NEGATIVE
LEUKOCYTE ESTERASE UR QL STRIP.AUTO: ABNORMAL
LYMPHOCYTES # BLD AUTO: 1.37 10*3/MM3 (ref 0.6–4.8)
LYMPHOCYTES NFR BLD AUTO: 10 % (ref 24–44)
MCH RBC QN AUTO: 28.9 PG (ref 27–31)
MCHC RBC AUTO-ENTMCNC: 31.8 G/DL (ref 32–36)
MCV RBC AUTO: 90.8 FL (ref 80–99)
MONOCYTES # BLD AUTO: 1.45 10*3/MM3 (ref 0–1)
MONOCYTES NFR BLD AUTO: 10.6 % (ref 0–12)
NEUTROPHILS # BLD AUTO: 10.8 10*3/MM3 (ref 1.5–8.3)
NEUTROPHILS NFR BLD AUTO: 78.6 % (ref 41–71)
NITRITE UR QL STRIP: NEGATIVE
PH UR STRIP.AUTO: <=5 [PH] (ref 5–8)
PLATELET # BLD AUTO: 290 10*3/MM3 (ref 150–450)
PMV BLD AUTO: 12.5 FL (ref 6–12)
POTASSIUM BLD-SCNC: 3.9 MMOL/L (ref 3.5–5.5)
PROT SERPL-MCNC: 7.4 G/DL (ref 5.7–8.2)
PROT UR QL STRIP: ABNORMAL
RBC # BLD AUTO: 4.33 10*6/MM3 (ref 3.89–5.14)
RBC # UR: ABNORMAL /HPF
REF LAB TEST METHOD: ABNORMAL
RENAL EPI CELLS #/AREA URNS HPF: ABNORMAL /HPF
SODIUM BLD-SCNC: 135 MMOL/L (ref 132–146)
SP GR UR STRIP: 1.01 (ref 1–1.03)
SQUAMOUS #/AREA URNS HPF: ABNORMAL /HPF
TRANS CELLS #/AREA URNS HPF: ABNORMAL /HPF
TROPONIN I SERPL-MCNC: 0 NG/ML (ref 0–0.07)
UROBILINOGEN UR QL STRIP: ABNORMAL
WBC NRBC COR # BLD: 13.74 10*3/MM3 (ref 3.5–10.8)
WBC UR QL AUTO: ABNORMAL /HPF

## 2018-02-21 PROCEDURE — 80053 COMPREHEN METABOLIC PANEL: CPT | Performed by: EMERGENCY MEDICINE

## 2018-02-21 PROCEDURE — 96365 THER/PROPH/DIAG IV INF INIT: CPT

## 2018-02-21 PROCEDURE — 81001 URINALYSIS AUTO W/SCOPE: CPT | Performed by: EMERGENCY MEDICINE

## 2018-02-21 PROCEDURE — 96361 HYDRATE IV INFUSION ADD-ON: CPT

## 2018-02-21 PROCEDURE — 93005 ELECTROCARDIOGRAM TRACING: CPT | Performed by: EMERGENCY MEDICINE

## 2018-02-21 PROCEDURE — 71045 X-RAY EXAM CHEST 1 VIEW: CPT

## 2018-02-21 PROCEDURE — 36415 COLL VENOUS BLD VENIPUNCTURE: CPT

## 2018-02-21 PROCEDURE — 85025 COMPLETE CBC W/AUTO DIFF WBC: CPT | Performed by: EMERGENCY MEDICINE

## 2018-02-21 PROCEDURE — 73502 X-RAY EXAM HIP UNI 2-3 VIEWS: CPT

## 2018-02-21 PROCEDURE — 87186 SC STD MICRODIL/AGAR DIL: CPT | Performed by: EMERGENCY MEDICINE

## 2018-02-21 PROCEDURE — 73560 X-RAY EXAM OF KNEE 1 OR 2: CPT

## 2018-02-21 PROCEDURE — 84484 ASSAY OF TROPONIN QUANT: CPT

## 2018-02-21 PROCEDURE — 99284 EMERGENCY DEPT VISIT MOD MDM: CPT

## 2018-02-21 PROCEDURE — 87077 CULTURE AEROBIC IDENTIFY: CPT | Performed by: EMERGENCY MEDICINE

## 2018-02-21 PROCEDURE — 87086 URINE CULTURE/COLONY COUNT: CPT | Performed by: EMERGENCY MEDICINE

## 2018-02-21 RX ORDER — SODIUM CHLORIDE 0.9 % (FLUSH) 0.9 %
10 SYRINGE (ML) INJECTION AS NEEDED
Status: DISCONTINUED | OUTPATIENT
Start: 2018-02-21 | End: 2018-02-22 | Stop reason: HOSPADM

## 2018-02-21 RX ORDER — BUDESONIDE AND FORMOTEROL FUMARATE DIHYDRATE 160; 4.5 UG/1; UG/1
2 AEROSOL RESPIRATORY (INHALATION)
COMMUNITY

## 2018-02-21 RX ORDER — CEFTRIAXONE SODIUM 1 G/50ML
1 INJECTION, SOLUTION INTRAVENOUS ONCE
Status: COMPLETED | OUTPATIENT
Start: 2018-02-21 | End: 2018-02-22

## 2018-02-21 RX ORDER — CEFDINIR 300 MG/1
300 CAPSULE ORAL 2 TIMES DAILY
Qty: 14 CAPSULE | Refills: 0 | Status: SHIPPED | OUTPATIENT
Start: 2018-02-21 | End: 2018-02-28

## 2018-02-21 RX ADMIN — SODIUM CHLORIDE 500 ML: 9 INJECTION, SOLUTION INTRAVENOUS at 22:06

## 2018-02-22 VITALS
DIASTOLIC BLOOD PRESSURE: 64 MMHG | RESPIRATION RATE: 18 BRPM | SYSTOLIC BLOOD PRESSURE: 152 MMHG | OXYGEN SATURATION: 100 % | WEIGHT: 119 LBS | HEIGHT: 58 IN | BODY MASS INDEX: 24.98 KG/M2 | TEMPERATURE: 98 F | HEART RATE: 87 BPM

## 2018-02-22 PROCEDURE — 25010000002 CEFTRIAXONE PER 250 MG: Performed by: EMERGENCY MEDICINE

## 2018-02-22 PROCEDURE — 96365 THER/PROPH/DIAG IV INF INIT: CPT

## 2018-02-22 RX ADMIN — CEFTRIAXONE SODIUM 1 G: 1 INJECTION, SOLUTION INTRAVENOUS at 00:06

## 2018-02-22 NOTE — DISCHARGE INSTRUCTIONS
Follow up with Dr. Bethea 1 week for repeat urine testing and lab testing.     Patient should take antibiotics as prescribed.     Immediately return to the emergency department for any new or worsening symptoms.

## 2018-02-22 NOTE — ED PROVIDER NOTES
Subjective   HPI Comments: Ana Cleary is a 89 y.o. female with a hx of a hysterectomy and appendectomy who presents to the ED s/p fall. The patient reports that she fell without losing consciousness at [facility] assisted living today but is unable to specify when. Her daughter was called and informed of the fall at 2015 and was told that she was being taken to the ED out of concern for a broken ankle. Additionally, her daughter states that her mother has been typically very weak at baseline as of late and requires assistance in order to ambulate. The patient complains of some left knee pain, right hip pain, and some rectal pain, but denies chest pain, SoA, abdominal pain, headache, or any other acute complaints at this time. She has been a resident of [facility] for a year and half.    Patient is a 89 y.o. female presenting with fall.   History provided by:  Patient and relative  Fall   Mechanism of injury: fall    Injury location:  Pelvis and leg  Pelvic injury location:  R hip  Leg injury location:  L knee  Incident location:  Nursing home  Fall:     Fall occurred:  Unable to specify    Impact surface:  Unable to specify    Point of impact:  Unable to specify  Suspicion of alcohol use: no    Suspicion of drug use: no    Tetanus status:  Unknown  Current pain details:     Pain quality:  Unable to specify    Pain Severity:  Unable to specify    Pain timing:  Constant  Associated symptoms: no abdominal pain, no chest pain, no headaches and no loss of consciousness        Review of Systems   Constitutional: Negative for chills and fever.   Respiratory: Negative for shortness of breath.    Cardiovascular: Negative for chest pain.   Gastrointestinal: Positive for rectal pain. Negative for abdominal pain.   Musculoskeletal:        Left knee pain and right hip pain   Neurological: Positive for weakness. Negative for loss of consciousness, syncope and headaches.   All other systems reviewed and are  negative.      Past Medical History:   Diagnosis Date   • Carotid artery disease    • Constipation    • Decubitus ulcer of buttock    • Dementia    • Fall at nursing home    • Falls frequently    • Hypertension    • Osteoarthritis    • Sciatica    • Stroke        No Known Allergies    Past Surgical History:   Procedure Laterality Date   • APPENDECTOMY     • CAROTID ENDARTERECTOMY Left    • HYSTERECTOMY         Family History   Problem Relation Age of Onset   • Family history unknown: Yes       Social History     Social History   • Marital status:      Spouse name: N/A   • Number of children: N/A   • Years of education: N/A     Social History Main Topics   • Smoking status: Never Smoker   • Smokeless tobacco: Never Used   • Alcohol use No   • Drug use: No   • Sexual activity: Defer     Other Topics Concern   • None     Social History Narrative    Patient consumes 1-2 servings coffee/tea daily.     Patient lives at [place], [place].              Objective   Physical Exam   Constitutional: She is oriented to person, place, and time. She appears well-developed and well-nourished. No distress.   She is at her baseline mentation. Pleasantly demented.   HENT:   Head: Normocephalic and atraumatic.   Nose: Nose normal.   Eyes: Conjunctivae are normal. No scleral icterus.   Neck: Normal range of motion. Neck supple.   Cardiovascular: Normal rate, regular rhythm, normal heart sounds and intact distal pulses.    No murmur heard.  Pulmonary/Chest: Effort normal and breath sounds normal. No respiratory distress.   Abdominal: Soft. Bowel sounds are normal. There is no tenderness.   Diffusely nontender   Genitourinary:   Genitourinary Comments: Mild tenderness over the right buttock with associated bruising that is green to yellowish in appearance consistent with an old fall. There are ulcers present in the sacral region without drainage or cellulitis.   Musculoskeletal: Normal range of motion. She exhibits no  edema.        Left knee: Tenderness (Mild tenderness to palpation over the anterior left knee) found.   No tenderness over the BUE or the left lower extremity.   Neurological: She is alert and oriented to person, place, and time.   Skin: Skin is warm and dry.   Psychiatric: She has a normal mood and affect. Her behavior is normal.   Nursing note and vitals reviewed.      Procedures         ED Course  ED Course   Comment By Time   Dr. Jordan is at the bedside re evaluating the patient. The patient declines the offer for admission and would like to be discharged home. Tim Harris 02/21 2349       Recent Results (from the past 24 hour(s))   Urinalysis With / Culture If Indicated - Urine, Catheter    Collection Time: 02/21/18 10:06 PM   Result Value Ref Range    Color, UA Yellow Yellow, Straw    Appearance, UA Turbid (A) Clear    pH, UA <=5.0 5.0 - 8.0    Specific Gravity, UA 1.011 1.001 - 1.030    Glucose, UA Negative Negative    Ketones, UA Negative Negative    Bilirubin, UA Negative Negative    Blood, UA Moderate (2+) (A) Negative    Protein,  mg/dL (2+) (A) Negative    Leuk Esterase, UA Large (3+) (A) Negative    Nitrite, UA Negative Negative    Urobilinogen, UA 1.0 E.U./dL 0.2 - 1.0 E.U./dL   Urinalysis, Microscopic Only - Urine, Clean Catch    Collection Time: 02/21/18 10:06 PM   Result Value Ref Range    RBC, UA 3-6 (A) None Seen, 0-2 /HPF    WBC, UA Too Numerous to Count (A) None Seen, 0-2 /HPF    Bacteria, UA 4+ (A) None Seen, Trace /HPF    Squamous Epithelial Cells, UA 3-6 (A) None Seen, 0-2 /HPF    Transitional Epithelial Cells, UA 0-2 0 - 2 /HPF    Renal Epithelial Cells, UA 0-2 0 - 2 /HPF    Hyaline Casts, UA None Seen 0 - 6 /LPF    Methodology Manual Light Microscopy    Comprehensive Metabolic Panel    Collection Time: 02/21/18 10:23 PM   Result Value Ref Range    Glucose 120 (H) 70 - 100 mg/dL    BUN 37 (H) 9 - 23 mg/dL    Creatinine 1.80 (H) 0.60 - 1.30 mg/dL    Sodium 135 132 - 146 mmol/L     Potassium 3.9 3.5 - 5.5 mmol/L    Chloride 100 99 - 109 mmol/L    CO2 26.0 20.0 - 31.0 mmol/L    Calcium 9.1 8.7 - 10.4 mg/dL    Total Protein 7.4 5.7 - 8.2 g/dL    Albumin 4.10 3.20 - 4.80 g/dL    ALT (SGPT) 4 (L) 7 - 40 U/L    AST (SGOT) 14 0 - 33 U/L    Alkaline Phosphatase 77 25 - 100 U/L    Total Bilirubin 0.4 0.3 - 1.2 mg/dL    eGFR Non African Amer 26 (L) >60 mL/min/1.73    Globulin 3.3 gm/dL    A/G Ratio 1.2 (L) 1.5 - 2.5 g/dL    BUN/Creatinine Ratio 20.6 7.0 - 25.0    Anion Gap 9.0 3.0 - 11.0 mmol/L   CBC Auto Differential    Collection Time: 02/21/18 10:23 PM   Result Value Ref Range    WBC 13.74 (H) 3.50 - 10.80 10*3/mm3    RBC 4.33 3.89 - 5.14 10*6/mm3    Hemoglobin 12.5 11.5 - 15.5 g/dL    Hematocrit 39.3 34.5 - 44.0 %    MCV 90.8 80.0 - 99.0 fL    MCH 28.9 27.0 - 31.0 pg    MCHC 31.8 (L) 32.0 - 36.0 g/dL    RDW 16.0 (H) 11.3 - 14.5 %    RDW-SD 53.5 37.0 - 54.0 fl    MPV 12.5 (H) 6.0 - 12.0 fL    Platelets 290 150 - 450 10*3/mm3    Neutrophil % 78.6 (H) 41.0 - 71.0 %    Lymphocyte % 10.0 (L) 24.0 - 44.0 %    Monocyte % 10.6 0.0 - 12.0 %    Eosinophil % 0.5 0.0 - 3.0 %    Basophil % 0.1 0.0 - 1.0 %    Immature Grans % 0.2 0.0 - 0.6 %    Neutrophils, Absolute 10.80 (H) 1.50 - 8.30 10*3/mm3    Lymphocytes, Absolute 1.37 0.60 - 4.80 10*3/mm3    Monocytes, Absolute 1.45 (H) 0.00 - 1.00 10*3/mm3    Eosinophils, Absolute 0.07 0.00 - 0.30 10*3/mm3    Basophils, Absolute 0.02 0.00 - 0.20 10*3/mm3    Immature Grans, Absolute 0.03 0.00 - 0.03 10*3/mm3   POC Troponin, Rapid    Collection Time: 02/21/18 10:28 PM   Result Value Ref Range    Troponin I 0.00 0.00 - 0.07 ng/mL     Note: In addition to lab results from this visit, the labs listed above may include labs taken at another facility or during a different encounter within the last 24 hours. Please correlate lab times with ED admission and discharge times for further clarification of the services performed during this visit.    XR Hip With or Without Pelvis 2 -  3 View Right   Final Result     No acute findings.      THIS DOCUMENT HAS BEEN ELECTRONICALLY SIGNED BY CRISTINA HUTTON MD      XR Chest 1 View   Final Result     No acute findings.      THIS DOCUMENT HAS BEEN ELECTRONICALLY SIGNED BY CRISTINA HUTTON MD      XR Knee 1 or 2 View Left   Final Result     No acute findings.      THIS DOCUMENT HAS BEEN ELECTRONICALLY SIGNED BY CRISTINA HUTTON MD        Vitals:    02/21/18 2300 02/21/18 2301 02/21/18 2307 02/21/18 2316   BP: 137/58      BP Location:       Patient Position:       Pulse:       Resp:       Temp:       TempSrc:       SpO2: (!) 87% 96% 98% 99%   Weight:       Height:         Medications   sodium chloride 0.9 % flush 10 mL (not administered)   cefTRIAXone (ROCEPHIN) IVPB 1 g (not administered)   sodium chloride 0.9 % bolus 500 mL (500 mL Intravenous New Bag 2/21/18 2206)     ECG/EMG Results (last 24 hours)     Procedure Component Value Units Date/Time    ECG 12 Lead [[id number]] Collected:  02/21/18 2216     Updated:  02/21/18 2217                      MDM  Number of Diagnoses or Management Options  Acute cystitis with hematuria: new and requires workup  Acute renal insufficiency: new and requires workup  Dehydration: new and requires workup  Diagnosis management comments: Take antibiotics as prescribed.     Patient should make sure she drinks plenty of fluids.    Follow-up with primary care physician for repeat urine and lab testing in approximately one week.       Amount and/or Complexity of Data Reviewed  Clinical lab tests: ordered and reviewed  Tests in the radiology section of CPT®: ordered and reviewed  Decide to obtain previous medical records or to obtain history from someone other than the patient: yes  Obtain history from someone other than the patient: yes  Review and summarize past medical records: yes  Independent visualization of images, tracings, or specimens: yes    Patient Progress  Patient progress: stable      Final diagnoses:   Acute cystitis with  hematuria       Documentation assistance provided by brayden Harris.  Information recorded by the scribe was done at my direction and has been verified and validated by me.     Tim Harris  02/21/18 2140       Tim Harris  02/21/18 7972       Phuong Jordan MD  02/21/18 8095

## 2018-02-24 LAB
BACTERIA SPEC AEROBE CULT: ABNORMAL
BACTERIA SPEC AEROBE CULT: ABNORMAL

## 2018-02-28 ENCOUNTER — APPOINTMENT (OUTPATIENT)
Dept: CT IMAGING | Facility: HOSPITAL | Age: 83
End: 2018-02-28

## 2018-02-28 ENCOUNTER — APPOINTMENT (OUTPATIENT)
Dept: GENERAL RADIOLOGY | Facility: HOSPITAL | Age: 83
End: 2018-02-28

## 2018-02-28 ENCOUNTER — HOSPITAL ENCOUNTER (EMERGENCY)
Facility: HOSPITAL | Age: 83
Discharge: HOME | End: 2018-03-01
Attending: EMERGENCY MEDICINE | Admitting: EMERGENCY MEDICINE

## 2018-02-28 DIAGNOSIS — R10.84 DIFFUSE ABDOMINAL PAIN: Primary | ICD-10-CM

## 2018-02-28 DIAGNOSIS — Z87.440 HISTORY OF UTI: ICD-10-CM

## 2018-02-28 DIAGNOSIS — R07.89 ATYPICAL CHEST PAIN: ICD-10-CM

## 2018-02-28 DIAGNOSIS — F03.90 DEMENTIA WITHOUT BEHAVIORAL DISTURBANCE, UNSPECIFIED DEMENTIA TYPE: ICD-10-CM

## 2018-02-28 DIAGNOSIS — B37.31 VAGINAL CANDIDIASIS: ICD-10-CM

## 2018-02-28 LAB
ALBUMIN SERPL-MCNC: 4.5 G/DL (ref 3.2–4.8)
ALBUMIN/GLOB SERPL: 1.2 G/DL (ref 1.5–2.5)
ALP SERPL-CCNC: 86 U/L (ref 25–100)
ALT SERPL W P-5'-P-CCNC: 5 U/L (ref 7–40)
ANION GAP SERPL CALCULATED.3IONS-SCNC: 17 MMOL/L (ref 3–11)
AST SERPL-CCNC: 28 U/L (ref 0–33)
BACTERIA UR QL AUTO: ABNORMAL /HPF
BASOPHILS # BLD AUTO: 0.02 10*3/MM3 (ref 0–0.2)
BASOPHILS NFR BLD AUTO: 0.2 % (ref 0–1)
BILIRUB SERPL-MCNC: 0.7 MG/DL (ref 0.3–1.2)
BILIRUB UR QL STRIP: NEGATIVE
BNP SERPL-MCNC: 129 PG/ML (ref 0–100)
BUN BLD-MCNC: 22 MG/DL (ref 9–23)
BUN/CREAT SERPL: 12.9 (ref 7–25)
CALCIUM SPEC-SCNC: 9.7 MG/DL (ref 8.7–10.4)
CHLORIDE SERPL-SCNC: 98 MMOL/L (ref 99–109)
CLARITY UR: CLEAR
CO2 SERPL-SCNC: 20 MMOL/L (ref 20–31)
COLOR UR: YELLOW
CREAT BLD-MCNC: 1.7 MG/DL (ref 0.6–1.3)
DEPRECATED RDW RBC AUTO: 52.4 FL (ref 37–54)
DEVELOPER EXPIRATION DATE: NORMAL
DEVELOPER LOT NUMBER: NORMAL
EOSINOPHIL # BLD AUTO: 0.2 10*3/MM3 (ref 0–0.3)
EOSINOPHIL NFR BLD AUTO: 2 % (ref 0–3)
ERYTHROCYTE [DISTWIDTH] IN BLOOD BY AUTOMATED COUNT: 15.9 % (ref 11.3–14.5)
EXPIRATION DATE: NORMAL
FECAL OCCULT BLOOD SCREEN, POC: NEGATIVE
GFR SERPL CREATININE-BSD FRML MDRD: 28 ML/MIN/1.73
GLOBULIN UR ELPH-MCNC: 3.7 GM/DL
GLUCOSE BLD-MCNC: 91 MG/DL (ref 70–100)
GLUCOSE UR STRIP-MCNC: NEGATIVE MG/DL
HCT VFR BLD AUTO: 42.2 % (ref 34.5–44)
HGB BLD-MCNC: 13.2 G/DL (ref 11.5–15.5)
HGB UR QL STRIP.AUTO: NEGATIVE
HOLD SPECIMEN: NORMAL
HOLD SPECIMEN: NORMAL
HYALINE CASTS UR QL AUTO: ABNORMAL /LPF
IMM GRANULOCYTES # BLD: 0.11 10*3/MM3 (ref 0–0.03)
IMM GRANULOCYTES NFR BLD: 1.1 % (ref 0–0.6)
KETONES UR QL STRIP: NEGATIVE
LEUKOCYTE ESTERASE UR QL STRIP.AUTO: ABNORMAL
LIPASE SERPL-CCNC: 140 U/L (ref 6–51)
LYMPHOCYTES # BLD AUTO: 2.55 10*3/MM3 (ref 0.6–4.8)
LYMPHOCYTES NFR BLD AUTO: 24.9 % (ref 24–44)
Lab: NORMAL
MCH RBC QN AUTO: 28.4 PG (ref 27–31)
MCHC RBC AUTO-ENTMCNC: 31.3 G/DL (ref 32–36)
MCV RBC AUTO: 90.8 FL (ref 80–99)
MONOCYTES # BLD AUTO: 1.32 10*3/MM3 (ref 0–1)
MONOCYTES NFR BLD AUTO: 12.9 % (ref 0–12)
NEGATIVE CONTROL: NEGATIVE
NEUTROPHILS # BLD AUTO: 6.04 10*3/MM3 (ref 1.5–8.3)
NEUTROPHILS NFR BLD AUTO: 58.9 % (ref 41–71)
NITRITE UR QL STRIP: NEGATIVE
NRBC BLD MANUAL-RTO: 0 /100 WBC (ref 0–0)
PH UR STRIP.AUTO: 6.5 [PH] (ref 5–8)
PLATELET # BLD AUTO: 293 10*3/MM3 (ref 150–450)
PMV BLD AUTO: 12 FL (ref 6–12)
POSITIVE CONTROL: POSITIVE
POTASSIUM BLD-SCNC: 3.4 MMOL/L (ref 3.5–5.5)
PROT SERPL-MCNC: 8.2 G/DL (ref 5.7–8.2)
PROT UR QL STRIP: NEGATIVE
RBC # BLD AUTO: 4.65 10*6/MM3 (ref 3.89–5.14)
RBC # UR: ABNORMAL /HPF
REF LAB TEST METHOD: ABNORMAL
SODIUM BLD-SCNC: 135 MMOL/L (ref 132–146)
SP GR UR STRIP: 1.01 (ref 1–1.03)
SQUAMOUS #/AREA URNS HPF: ABNORMAL /HPF
TROPONIN I SERPL-MCNC: 0 NG/ML (ref 0–0.07)
UROBILINOGEN UR QL STRIP: ABNORMAL
WBC NRBC COR # BLD: 10.24 10*3/MM3 (ref 3.5–10.8)
WBC UR QL AUTO: ABNORMAL /HPF
WHOLE BLOOD HOLD SPECIMEN: NORMAL
WHOLE BLOOD HOLD SPECIMEN: NORMAL

## 2018-02-28 PROCEDURE — 93005 ELECTROCARDIOGRAM TRACING: CPT | Performed by: EMERGENCY MEDICINE

## 2018-02-28 PROCEDURE — 82270 OCCULT BLOOD FECES: CPT | Performed by: PHYSICIAN ASSISTANT

## 2018-02-28 PROCEDURE — 99284 EMERGENCY DEPT VISIT MOD MDM: CPT

## 2018-02-28 PROCEDURE — 83690 ASSAY OF LIPASE: CPT | Performed by: PHYSICIAN ASSISTANT

## 2018-02-28 PROCEDURE — 84484 ASSAY OF TROPONIN QUANT: CPT

## 2018-02-28 PROCEDURE — 81001 URINALYSIS AUTO W/SCOPE: CPT | Performed by: PHYSICIAN ASSISTANT

## 2018-02-28 PROCEDURE — 83880 ASSAY OF NATRIURETIC PEPTIDE: CPT | Performed by: PHYSICIAN ASSISTANT

## 2018-02-28 PROCEDURE — 80053 COMPREHEN METABOLIC PANEL: CPT | Performed by: PHYSICIAN ASSISTANT

## 2018-02-28 PROCEDURE — 71045 X-RAY EXAM CHEST 1 VIEW: CPT

## 2018-02-28 PROCEDURE — P9612 CATHETERIZE FOR URINE SPEC: HCPCS

## 2018-02-28 PROCEDURE — 74176 CT ABD & PELVIS W/O CONTRAST: CPT

## 2018-02-28 PROCEDURE — 85025 COMPLETE CBC W/AUTO DIFF WBC: CPT | Performed by: PHYSICIAN ASSISTANT

## 2018-03-01 VITALS
DIASTOLIC BLOOD PRESSURE: 86 MMHG | SYSTOLIC BLOOD PRESSURE: 161 MMHG | OXYGEN SATURATION: 97 % | HEIGHT: 60 IN | TEMPERATURE: 97.4 F | HEART RATE: 104 BPM | RESPIRATION RATE: 22 BRPM | WEIGHT: 130 LBS | BODY MASS INDEX: 25.52 KG/M2

## 2018-03-01 LAB — TROPONIN I SERPL-MCNC: 0.01 NG/ML (ref 0–0.07)

## 2018-03-01 PROCEDURE — 84484 ASSAY OF TROPONIN QUANT: CPT

## 2018-03-01 RX ORDER — CLOTRIMAZOLE 1 G/ML
SOLUTION TOPICAL EVERY 12 HOURS SCHEDULED
Qty: 60 ML | Refills: 0 | Status: ON HOLD | OUTPATIENT
Start: 2018-03-01 | End: 2018-03-06

## 2018-03-01 NOTE — ED PROVIDER NOTES
Subjective   HPI Comments: Ana Cleary is a 89 y.o. female with dementia who presents to the ED with c/o vaginal pain. Per her family, they were called by [facility] this morning and was told that the patient had been constipated for several days. When family got to the nursing home, there was a bowl of loose stool in patient's room.  Question if an enema was given.  The patient now complains of vaginal and rectal pain that she states radiates up her body causing abdominal pain and chest pain. She has been becoming gradually more uncomfortable throughout the day which prompted her to be brought to the ED. She has no other acute complaints at this time. There has been no fever or chills.  Appetite has been decreased per family for the past week.  The patient has only had a hysterectomy in the past.  After reviewing patient's medical records, she was most recently seen in our ER on 2/21/2019 and was diagnosed with acute cystitis, dehydration, and acute renal insufficiency.  She was given a dose of Rocephin and discharged with Omnicef 300 mg by mouth twice daily for 7 days.  Patient has dementia and it is progressing per family.  She is a DNR code status.    Patient is a 89 y.o. female presenting with pain.   History provided by:  Patient and relative  Pain   Severity:  Unable to specify  Onset quality:  Gradual  Timing:  Constant  Progression:  Worsening  Chronicity:  New  Context:  The pt has been constipated and now complains of rectal, abd, and chest pain  Relieved by:  None tried  Worsened by:  Nothing  Ineffective treatments:  None tried  Associated symptoms: abdominal pain and chest pain    Associated symptoms: no fever        Review of Systems   Constitutional: Negative for chills and fever.   Cardiovascular: Positive for chest pain.   Gastrointestinal: Positive for abdominal pain, constipation and rectal pain.   Genitourinary: Positive for vaginal pain.   All other systems reviewed and are  negative.      Past Medical History:   Diagnosis Date   • Carotid artery disease    • Constipation    • Decubitus ulcer of buttock    • Dementia    • Fall at nursing home    • Falls frequently    • Hypertension    • Osteoarthritis    • Sciatica    • Stroke        No Known Allergies    Past Surgical History:   Procedure Laterality Date   • APPENDECTOMY     • CAROTID ENDARTERECTOMY Left    • HYSTERECTOMY         Family History   Problem Relation Age of Onset   • Family history unknown: Yes       Social History     Social History   • Marital status:      Social History Main Topics   • Smoking status: Never Smoker   • Smokeless tobacco: Never Used   • Alcohol use No   • Drug use: No   • Sexual activity: Defer     Social History Narrative    Patient consumes 1-2 servings coffee/tea daily.     Patient lives at [place], [place].              Objective   Physical Exam   Constitutional: She is oriented to person, place, and time. She appears well-developed and well-nourished.   Demented elderly female. She is moaning frequently on exam and holding right chest.    HENT:   Head: Normocephalic and atraumatic.   Nose: Nose normal.   Eyes: Conjunctivae are normal. No scleral icterus.   Neck: Normal range of motion. Neck supple.   Cardiovascular: Normal rate, regular rhythm, normal heart sounds and intact distal pulses.    No murmur heard.  No ectopy. No pedal edema in BLE.   Pulmonary/Chest: Effort normal and breath sounds normal. No respiratory distress. She exhibits tenderness.   Clear to auscultation bilaterally. There is point tenderness to the right anterior chest wall   Abdominal: Soft. Bowel sounds are normal. She exhibits no distension. There is tenderness. There is no rebound and no guarding.   Active bowel sounds in all 4 quadrants. There is mild diffuse tenderness to palpation.   Genitourinary: Rectal exam shows guaiac negative stool. There is erythema in the vagina.   Genitourinary Comments: Small  amounts of stool in the rectal vault. No gross blood or impaction. Confluent erythema to vagina and perineum.  There is dried stool in the folds of perineum.  Nursing staff cleaned patient thoroughly and placed barrier cream on perineal area.   Musculoskeletal: Normal range of motion. She exhibits no edema.   Neurological: She is alert and oriented to person, place, and time.   Dementia with advanced functional decline   Skin: Skin is warm and dry.   Psychiatric: Her behavior is normal. Her mood appears anxious. Cognition and memory are impaired.   Positive for dementia.  Patient anxious and moaning during exam.   Nursing note and vitals reviewed.      Procedures         ED Course  ED Course   Comment By Time   Workup is essentially within normal limits.  EKG showed normal sinus rhythm with right bundle-branch block, which is unchanged as compared to previous EKGs.  Troponin was 0.00.  Chest x-ray showed no active disease.  CT scan of the abdomen and pelvis without contrast showed no acute abnormalities.  CBC, chemistries, and urinalysis were essentially within normal limits.  Patient is on Omnicef for recent acute cystitis.  Discussed all results with patient and family, and they verbalized understanding.  Patient does have vaginal erythema, and she did have dry stool noted to her perineum upon arrival.  Recommend nursing staff to check her frequently and keep barrier cream in place. Tuyet Paez PA-C 03/01 0008   Repeat Troponin is 0.01.  Patient is stable for discharge to home. Tuyet Paez PA-C 03/01 0056         Recent Results (from the past 24 hour(s))   Light Blue Top    Collection Time: 02/28/18  9:59 PM   Result Value Ref Range    Extra Tube hold for add-on    Green Top (Gel)    Collection Time: 02/28/18  9:59 PM   Result Value Ref Range    Extra Tube Hold for add-ons.    Lavender Top    Collection Time: 02/28/18  9:59 PM   Result Value Ref Range    Extra Tube hold for add-on    Gold Top - SST     Collection Time: 02/28/18  9:59 PM   Result Value Ref Range    Extra Tube Hold for add-ons.    Comprehensive Metabolic Panel    Collection Time: 02/28/18  9:59 PM   Result Value Ref Range    Glucose 91 70 - 100 mg/dL    BUN 22 9 - 23 mg/dL    Creatinine 1.70 (H) 0.60 - 1.30 mg/dL    Sodium 135 132 - 146 mmol/L    Potassium 3.4 (L) 3.5 - 5.5 mmol/L    Chloride 98 (L) 99 - 109 mmol/L    CO2 20.0 20.0 - 31.0 mmol/L    Calcium 9.7 8.7 - 10.4 mg/dL    Total Protein 8.2 5.7 - 8.2 g/dL    Albumin 4.50 3.20 - 4.80 g/dL    ALT (SGPT) 5 (L) 7 - 40 U/L    AST (SGOT) 28 0 - 33 U/L    Alkaline Phosphatase 86 25 - 100 U/L    Total Bilirubin 0.7 0.3 - 1.2 mg/dL    eGFR Non African Amer 28 (L) >60 mL/min/1.73    Globulin 3.7 gm/dL    A/G Ratio 1.2 (L) 1.5 - 2.5 g/dL    BUN/Creatinine Ratio 12.9 7.0 - 25.0    Anion Gap 17.0 (H) 3.0 - 11.0 mmol/L   Lipase    Collection Time: 02/28/18  9:59 PM   Result Value Ref Range    Lipase 140 (H) 6 - 51 U/L   BNP    Collection Time: 02/28/18  9:59 PM   Result Value Ref Range    .0 (H) 0.0 - 100.0 pg/mL   CBC Auto Differential    Collection Time: 02/28/18  9:59 PM   Result Value Ref Range    WBC 10.24 3.50 - 10.80 10*3/mm3    RBC 4.65 3.89 - 5.14 10*6/mm3    Hemoglobin 13.2 11.5 - 15.5 g/dL    Hematocrit 42.2 34.5 - 44.0 %    MCV 90.8 80.0 - 99.0 fL    MCH 28.4 27.0 - 31.0 pg    MCHC 31.3 (L) 32.0 - 36.0 g/dL    RDW 15.9 (H) 11.3 - 14.5 %    RDW-SD 52.4 37.0 - 54.0 fl    MPV 12.0 6.0 - 12.0 fL    Platelets 293 150 - 450 10*3/mm3    Neutrophil % 58.9 41.0 - 71.0 %    Lymphocyte % 24.9 24.0 - 44.0 %    Monocyte % 12.9 (H) 0.0 - 12.0 %    Eosinophil % 2.0 0.0 - 3.0 %    Basophil % 0.2 0.0 - 1.0 %    Immature Grans % 1.1 (H) 0.0 - 0.6 %    Neutrophils, Absolute 6.04 1.50 - 8.30 10*3/mm3    Lymphocytes, Absolute 2.55 0.60 - 4.80 10*3/mm3    Monocytes, Absolute 1.32 (H) 0.00 - 1.00 10*3/mm3    Eosinophils, Absolute 0.20 0.00 - 0.30 10*3/mm3    Basophils, Absolute 0.02 0.00 - 0.20 10*3/mm3     Immature Grans, Absolute 0.11 (H) 0.00 - 0.03 10*3/mm3    nRBC 0.0 0.0 - 0.0 /100 WBC   POC Troponin, Rapid    Collection Time: 02/28/18 10:05 PM   Result Value Ref Range    Troponin I 0.00 0.00 - 0.07 ng/mL   POCT Occult Blood, stool    Collection Time: 02/28/18 10:08 PM   Result Value Ref Range    Fecal Occult Blood Negative Negative    Lot Number [id number]     Expiration Date 5/20     DEVELOPER LOT NUMBER [id number]     DEVELOPER EXPIRATION DATE 10/20     Positive Control Positive Positive    Negative Control Negative Negative   Urinalysis With / Microscopic If Indicated - Urine, Catheter    Collection Time: 02/28/18 10:25 PM   Result Value Ref Range    Color, UA Yellow Yellow, Straw    Appearance, UA Clear Clear    pH, UA 6.5 5.0 - 8.0    Specific Gravity, UA 1.006 1.001 - 1.030    Glucose, UA Negative Negative    Ketones, UA Negative Negative    Bilirubin, UA Negative Negative    Blood, UA Negative Negative    Protein, UA Negative Negative    Leuk Esterase, UA Small (1+) (A) Negative    Nitrite, UA Negative Negative    Urobilinogen, UA 0.2 E.U./dL 0.2 - 1.0 E.U./dL   Urinalysis, Microscopic Only - Urine, Clean Catch    Collection Time: 02/28/18 10:25 PM   Result Value Ref Range    RBC, UA 0-2 None Seen, 0-2 /HPF    WBC, UA 3-5 (A) None Seen, 0-2 /HPF    Bacteria, UA None Seen None Seen, Trace /HPF    Squamous Epithelial Cells, UA 3-6 (A) None Seen, 0-2 /HPF    Hyaline Casts, UA 0-6 0 - 6 /LPF    Methodology Manual Light Microscopy    POC Troponin, Rapid    Collection Time: 03/01/18 12:29 AM   Result Value Ref Range    Troponin I 0.01 0.00 - 0.07 ng/mL     Note: In addition to lab results from this visit, the labs listed above may include labs taken at another facility or during a different encounter within the last 24 hours. Please correlate lab times with ED admission and discharge times for further clarification of the services performed during this visit.    CT Abdomen Pelvis Without Contrast   Final Result       1. No acute findings.      2. Non-acute findings are described above.      THIS DOCUMENT HAS BEEN ELECTRONICALLY SIGNED BY KATHI EUBANKS MD      XR Chest 1 View   Final Result      1. No acute findings.      2. Non-acute findings are described above.      THIS DOCUMENT HAS BEEN ELECTRONICALLY SIGNED BY KATHI EUBANKS MD        Vitals:    03/01/18 0000 03/01/18 0015 03/01/18 0032 03/01/18 0035   BP: 141/83 146/98 161/86    Patient Position:       Pulse:  103  104   Resp:       Temp:       TempSrc:       SpO2:  99%  97%   Weight:       Height:         Medications - No data to display  ECG/EMG Results (last 24 hours)     Procedure Component Value Units Date/Time    ECG 12 Lead [[id number]] Collected:  02/28/18 2135     Updated:  02/28/18 2141    Narrative:       Test Reason : cp  Blood Pressure : **/** mmHG  Vent. Rate : 095 BPM     Atrial Rate : 095 BPM     P-R Int : 156 ms          QRS Dur : 142 ms      QT Int : 438 ms       P-R-T Axes : 000 061 -04 degrees     QTc Int : 550 ms    Normal sinus rhythm  Right bundle branch block  Abnormal ECG  When compared with ECG of 21-FEB-2018 22:16,  QT has lengthened  Confirmed by PIPER MONDRAGON ([id number]) on 2/28/2018 9:41:10 PM    Referred By:  DONNY           Confirmed By:PIPER MONDRAGON                    [facility]    Final diagnoses:   Diffuse abdominal pain   History of UTI   Dementia without behavioral disturbance, unspecified dementia type   Atypical chest pain   Vaginal candidiasis       Documentation assistance provided by brayden Harris.  Information recorded by the scribe was done at my direction and has been verified and validated by me.     Tim Harris  02/28/18 2208       Tim Harris  02/28/18 2210       Tuyet Paez PA-C  03/01/18 0058       Tuyet Paez PA-C  03/01/18 0418

## 2018-03-01 NOTE — DISCHARGE INSTRUCTIONS
Cardiac workup was negative with normal EKG, negative Troponins x 2 sets, and chest x-ray showed no active disease.  CT scan of the abdomen and pelvis without contrast showed no acute abnormalities.  Patient's urinalysis has also improved with recent diagnosis of a urinary tract infection.  Patient needs to complete course of Omnicef as directed.  Rx for clotrimazole 1% cream twice daily for 7 days.  Recommend frequent changes per nursing staff to help improve irritation.  Recommend close follow-up with primary care physician.  Return if worsening symptoms.

## 2018-03-05 ENCOUNTER — HOSPITAL ENCOUNTER (OUTPATIENT)
Facility: HOSPITAL | Age: 83
Setting detail: OBSERVATION
Discharge: NURSING, REHAB OR LONG-TERM CARE | End: 2018-03-09
Attending: EMERGENCY MEDICINE | Admitting: INTERNAL MEDICINE

## 2018-03-05 DIAGNOSIS — Z74.09 IMPAIRED FUNCTIONAL MOBILITY, BALANCE, GAIT, AND ENDURANCE: ICD-10-CM

## 2018-03-05 DIAGNOSIS — L89.311 DECUBITUS ULCER OF RIGHT BUTTOCK, STAGE 1: ICD-10-CM

## 2018-03-05 DIAGNOSIS — E87.20 LACTIC ACIDOSIS: ICD-10-CM

## 2018-03-05 DIAGNOSIS — R10.9 ABDOMINAL PAIN, UNSPECIFIED ABDOMINAL LOCATION: Primary | ICD-10-CM

## 2018-03-05 PROCEDURE — 96374 THER/PROPH/DIAG INJ IV PUSH: CPT

## 2018-03-05 PROCEDURE — 96375 TX/PRO/DX INJ NEW DRUG ADDON: CPT

## 2018-03-05 PROCEDURE — P9612 CATHETERIZE FOR URINE SPEC: HCPCS

## 2018-03-05 PROCEDURE — 99284 EMERGENCY DEPT VISIT MOD MDM: CPT

## 2018-03-06 ENCOUNTER — APPOINTMENT (OUTPATIENT)
Dept: GENERAL RADIOLOGY | Facility: HOSPITAL | Age: 83
End: 2018-03-06

## 2018-03-06 ENCOUNTER — APPOINTMENT (OUTPATIENT)
Dept: CT IMAGING | Facility: HOSPITAL | Age: 83
End: 2018-03-06

## 2018-03-06 PROBLEM — K59.00 CONSTIPATION: Status: ACTIVE | Noted: 2018-03-06

## 2018-03-06 PROBLEM — E87.20 LACTIC ACIDOSIS: Status: ACTIVE | Noted: 2018-03-06

## 2018-03-06 PROBLEM — E87.6 HYPOKALEMIA: Status: ACTIVE | Noted: 2018-03-06

## 2018-03-06 PROBLEM — R10.9 ABDOMINAL PAIN: Status: ACTIVE | Noted: 2018-03-06

## 2018-03-06 LAB
ALBUMIN SERPL-MCNC: 3.9 G/DL (ref 3.2–4.8)
ALBUMIN SERPL-MCNC: 4.6 G/DL (ref 3.2–4.8)
ALBUMIN/GLOB SERPL: 1.2 G/DL (ref 1.5–2.5)
ALBUMIN/GLOB SERPL: 1.3 G/DL (ref 1.5–2.5)
ALP SERPL-CCNC: 71 U/L (ref 25–100)
ALP SERPL-CCNC: 82 U/L (ref 25–100)
ALT SERPL W P-5'-P-CCNC: 5 U/L (ref 7–40)
ALT SERPL W P-5'-P-CCNC: 6 U/L (ref 7–40)
ANION GAP SERPL CALCULATED.3IONS-SCNC: 12 MMOL/L (ref 3–11)
ANION GAP SERPL CALCULATED.3IONS-SCNC: 15 MMOL/L (ref 3–11)
AST SERPL-CCNC: 12 U/L (ref 0–33)
AST SERPL-CCNC: 9 U/L (ref 0–33)
BACTERIA UR QL AUTO: ABNORMAL /HPF
BASOPHILS # BLD AUTO: 0.02 10*3/MM3 (ref 0–0.2)
BASOPHILS # BLD AUTO: 0.02 10*3/MM3 (ref 0–0.2)
BASOPHILS NFR BLD AUTO: 0.2 % (ref 0–1)
BASOPHILS NFR BLD AUTO: 0.2 % (ref 0–1)
BILIRUB SERPL-MCNC: 0.9 MG/DL (ref 0.3–1.2)
BILIRUB SERPL-MCNC: 1 MG/DL (ref 0.3–1.2)
BILIRUB UR QL STRIP: NEGATIVE
BUN BLD-MCNC: 14 MG/DL (ref 9–23)
BUN BLD-MCNC: 16 MG/DL (ref 9–23)
BUN/CREAT SERPL: 14 (ref 7–25)
BUN/CREAT SERPL: 14.5 (ref 7–25)
CALCIUM SPEC-SCNC: 8.8 MG/DL (ref 8.7–10.4)
CALCIUM SPEC-SCNC: 9.6 MG/DL (ref 8.7–10.4)
CHLORIDE SERPL-SCNC: 104 MMOL/L (ref 99–109)
CHLORIDE SERPL-SCNC: 98 MMOL/L (ref 99–109)
CLARITY UR: CLEAR
CO2 SERPL-SCNC: 22 MMOL/L (ref 20–31)
CO2 SERPL-SCNC: 26 MMOL/L (ref 20–31)
COLOR UR: YELLOW
CREAT BLD-MCNC: 1 MG/DL (ref 0.6–1.3)
CREAT BLD-MCNC: 1.1 MG/DL (ref 0.6–1.3)
D-LACTATE SERPL-SCNC: 1.3 MMOL/L (ref 0.5–2)
D-LACTATE SERPL-SCNC: 2.4 MMOL/L (ref 0.5–2)
DEPRECATED RDW RBC AUTO: 52 FL (ref 37–54)
DEPRECATED RDW RBC AUTO: 52.6 FL (ref 37–54)
EOSINOPHIL # BLD AUTO: 0.1 10*3/MM3 (ref 0–0.3)
EOSINOPHIL # BLD AUTO: 0.13 10*3/MM3 (ref 0–0.3)
EOSINOPHIL NFR BLD AUTO: 1.1 % (ref 0–3)
EOSINOPHIL NFR BLD AUTO: 1.1 % (ref 0–3)
ERYTHROCYTE [DISTWIDTH] IN BLOOD BY AUTOMATED COUNT: 15.7 % (ref 11.3–14.5)
ERYTHROCYTE [DISTWIDTH] IN BLOOD BY AUTOMATED COUNT: 15.7 % (ref 11.3–14.5)
GFR SERPL CREATININE-BSD FRML MDRD: 47 ML/MIN/1.73
GFR SERPL CREATININE-BSD FRML MDRD: 52 ML/MIN/1.73
GLOBULIN UR ELPH-MCNC: 3.1 GM/DL
GLOBULIN UR ELPH-MCNC: 3.7 GM/DL
GLUCOSE BLD-MCNC: 80 MG/DL (ref 70–100)
GLUCOSE BLD-MCNC: 91 MG/DL (ref 70–100)
GLUCOSE UR STRIP-MCNC: NEGATIVE MG/DL
HCT VFR BLD AUTO: 40.7 % (ref 34.5–44)
HCT VFR BLD AUTO: 42.5 % (ref 34.5–44)
HGB BLD-MCNC: 12.5 G/DL (ref 11.5–15.5)
HGB BLD-MCNC: 13.4 G/DL (ref 11.5–15.5)
HGB UR QL STRIP.AUTO: NEGATIVE
HOLD SPECIMEN: NORMAL
HYALINE CASTS UR QL AUTO: ABNORMAL /LPF
IMM GRANULOCYTES # BLD: 0.03 10*3/MM3 (ref 0–0.03)
IMM GRANULOCYTES # BLD: 0.04 10*3/MM3 (ref 0–0.03)
IMM GRANULOCYTES NFR BLD: 0.3 % (ref 0–0.6)
IMM GRANULOCYTES NFR BLD: 0.3 % (ref 0–0.6)
KETONES UR QL STRIP: NEGATIVE
LEUKOCYTE ESTERASE UR QL STRIP.AUTO: ABNORMAL
LIPASE SERPL-CCNC: 61 U/L (ref 6–51)
LYMPHOCYTES # BLD AUTO: 1.25 10*3/MM3 (ref 0.6–4.8)
LYMPHOCYTES # BLD AUTO: 2.05 10*3/MM3 (ref 0.6–4.8)
LYMPHOCYTES NFR BLD AUTO: 10.7 % (ref 24–44)
LYMPHOCYTES NFR BLD AUTO: 23.1 % (ref 24–44)
MAGNESIUM SERPL-MCNC: 2 MG/DL (ref 1.3–2.7)
MCH RBC QN AUTO: 28.2 PG (ref 27–31)
MCH RBC QN AUTO: 28.6 PG (ref 27–31)
MCHC RBC AUTO-ENTMCNC: 30.7 G/DL (ref 32–36)
MCHC RBC AUTO-ENTMCNC: 31.5 G/DL (ref 32–36)
MCV RBC AUTO: 90.8 FL (ref 80–99)
MCV RBC AUTO: 91.7 FL (ref 80–99)
MONOCYTES # BLD AUTO: 1.04 10*3/MM3 (ref 0–1)
MONOCYTES # BLD AUTO: 1.29 10*3/MM3 (ref 0–1)
MONOCYTES NFR BLD AUTO: 11.1 % (ref 0–12)
MONOCYTES NFR BLD AUTO: 11.7 % (ref 0–12)
NEUTROPHILS # BLD AUTO: 5.63 10*3/MM3 (ref 1.5–8.3)
NEUTROPHILS # BLD AUTO: 8.91 10*3/MM3 (ref 1.5–8.3)
NEUTROPHILS NFR BLD AUTO: 63.6 % (ref 41–71)
NEUTROPHILS NFR BLD AUTO: 76.6 % (ref 41–71)
NITRITE UR QL STRIP: NEGATIVE
PH UR STRIP.AUTO: 6 [PH] (ref 5–8)
PLATELET # BLD AUTO: 251 10*3/MM3 (ref 150–450)
PLATELET # BLD AUTO: 287 10*3/MM3 (ref 150–450)
PMV BLD AUTO: 11.7 FL (ref 6–12)
PMV BLD AUTO: 12.1 FL (ref 6–12)
POTASSIUM BLD-SCNC: 3.1 MMOL/L (ref 3.5–5.5)
POTASSIUM BLD-SCNC: 3.4 MMOL/L (ref 3.5–5.5)
PROT SERPL-MCNC: 7 G/DL (ref 5.7–8.2)
PROT SERPL-MCNC: 8.3 G/DL (ref 5.7–8.2)
PROT UR QL STRIP: NEGATIVE
RBC # BLD AUTO: 4.44 10*6/MM3 (ref 3.89–5.14)
RBC # BLD AUTO: 4.68 10*6/MM3 (ref 3.89–5.14)
RBC # UR: ABNORMAL /HPF
REF LAB TEST METHOD: ABNORMAL
SODIUM BLD-SCNC: 135 MMOL/L (ref 132–146)
SODIUM BLD-SCNC: 142 MMOL/L (ref 132–146)
SP GR UR STRIP: 1.01 (ref 1–1.03)
SQUAMOUS #/AREA URNS HPF: ABNORMAL /HPF
TROPONIN I SERPL-MCNC: 0 NG/ML (ref 0–0.07)
UROBILINOGEN UR QL STRIP: ABNORMAL
WBC NRBC COR # BLD: 11.64 10*3/MM3 (ref 3.5–10.8)
WBC NRBC COR # BLD: 8.87 10*3/MM3 (ref 3.5–10.8)
WBC UR QL AUTO: ABNORMAL /HPF
WHOLE BLOOD HOLD SPECIMEN: NORMAL
WHOLE BLOOD HOLD SPECIMEN: NORMAL

## 2018-03-06 PROCEDURE — 80053 COMPREHEN METABOLIC PANEL: CPT | Performed by: EMERGENCY MEDICINE

## 2018-03-06 PROCEDURE — 74177 CT ABD & PELVIS W/CONTRAST: CPT

## 2018-03-06 PROCEDURE — 94799 UNLISTED PULMONARY SVC/PX: CPT

## 2018-03-06 PROCEDURE — 96372 THER/PROPH/DIAG INJ SC/IM: CPT

## 2018-03-06 PROCEDURE — 0 IOPAMIDOL PER 1 ML: Performed by: EMERGENCY MEDICINE

## 2018-03-06 PROCEDURE — G0378 HOSPITAL OBSERVATION PER HR: HCPCS

## 2018-03-06 PROCEDURE — 83735 ASSAY OF MAGNESIUM: CPT | Performed by: NURSE PRACTITIONER

## 2018-03-06 PROCEDURE — 84484 ASSAY OF TROPONIN QUANT: CPT

## 2018-03-06 PROCEDURE — 83605 ASSAY OF LACTIC ACID: CPT | Performed by: EMERGENCY MEDICINE

## 2018-03-06 PROCEDURE — 93005 ELECTROCARDIOGRAM TRACING: CPT | Performed by: EMERGENCY MEDICINE

## 2018-03-06 PROCEDURE — 94640 AIRWAY INHALATION TREATMENT: CPT

## 2018-03-06 PROCEDURE — 85025 COMPLETE CBC W/AUTO DIFF WBC: CPT | Performed by: NURSE PRACTITIONER

## 2018-03-06 PROCEDURE — 83690 ASSAY OF LIPASE: CPT | Performed by: EMERGENCY MEDICINE

## 2018-03-06 PROCEDURE — 25010000002 FENTANYL CITRATE (PF) 100 MCG/2ML SOLUTION: Performed by: EMERGENCY MEDICINE

## 2018-03-06 PROCEDURE — 85025 COMPLETE CBC W/AUTO DIFF WBC: CPT | Performed by: EMERGENCY MEDICINE

## 2018-03-06 PROCEDURE — 81001 URINALYSIS AUTO W/SCOPE: CPT | Performed by: EMERGENCY MEDICINE

## 2018-03-06 PROCEDURE — 25010000002 LORAZEPAM PER 2 MG: Performed by: EMERGENCY MEDICINE

## 2018-03-06 PROCEDURE — 99220 PR INITIAL OBSERVATION CARE/DAY 70 MINUTES: CPT | Performed by: INTERNAL MEDICINE

## 2018-03-06 PROCEDURE — 74022 RADEX COMPL AQT ABD SERIES: CPT

## 2018-03-06 PROCEDURE — 96375 TX/PRO/DX INJ NEW DRUG ADDON: CPT

## 2018-03-06 PROCEDURE — 97161 PT EVAL LOW COMPLEX 20 MIN: CPT

## 2018-03-06 PROCEDURE — 25010000002 HEPARIN (PORCINE) PER 1000 UNITS: Performed by: NURSE PRACTITIONER

## 2018-03-06 PROCEDURE — 94760 N-INVAS EAR/PLS OXIMETRY 1: CPT

## 2018-03-06 PROCEDURE — G8979 MOBILITY GOAL STATUS: HCPCS

## 2018-03-06 PROCEDURE — 80053 COMPREHEN METABOLIC PANEL: CPT | Performed by: NURSE PRACTITIONER

## 2018-03-06 PROCEDURE — 25010000002 ONDANSETRON PER 1 MG: Performed by: EMERGENCY MEDICINE

## 2018-03-06 PROCEDURE — 71275 CT ANGIOGRAPHY CHEST: CPT

## 2018-03-06 PROCEDURE — G8978 MOBILITY CURRENT STATUS: HCPCS

## 2018-03-06 RX ORDER — ONDANSETRON 4 MG/1
4 TABLET, FILM COATED ORAL EVERY 6 HOURS PRN
Status: DISCONTINUED | OUTPATIENT
Start: 2018-03-06 | End: 2018-03-09 | Stop reason: HOSPADM

## 2018-03-06 RX ORDER — LISINOPRIL 10 MG/1
10 TABLET ORAL
Status: DISCONTINUED | OUTPATIENT
Start: 2018-03-06 | End: 2018-03-09 | Stop reason: HOSPADM

## 2018-03-06 RX ORDER — FUROSEMIDE 20 MG/1
20 TABLET ORAL DAILY
Status: DISCONTINUED | OUTPATIENT
Start: 2018-03-06 | End: 2018-03-09 | Stop reason: HOSPADM

## 2018-03-06 RX ORDER — BUDESONIDE AND FORMOTEROL FUMARATE DIHYDRATE 160; 4.5 UG/1; UG/1
2 AEROSOL RESPIRATORY (INHALATION)
Status: DISCONTINUED | OUTPATIENT
Start: 2018-03-06 | End: 2018-03-09 | Stop reason: HOSPADM

## 2018-03-06 RX ORDER — AMLODIPINE BESYLATE 5 MG/1
5 TABLET ORAL DAILY
Status: DISCONTINUED | OUTPATIENT
Start: 2018-03-06 | End: 2018-03-09 | Stop reason: HOSPADM

## 2018-03-06 RX ORDER — HEPARIN SODIUM 5000 [USP'U]/ML
5000 INJECTION, SOLUTION INTRAVENOUS; SUBCUTANEOUS EVERY 8 HOURS SCHEDULED
Status: DISCONTINUED | OUTPATIENT
Start: 2018-03-06 | End: 2018-03-09 | Stop reason: HOSPADM

## 2018-03-06 RX ORDER — ACETAMINOPHEN 500 MG
500 TABLET ORAL EVERY 4 HOURS PRN
COMMUNITY
End: 2018-03-09 | Stop reason: HOSPADM

## 2018-03-06 RX ORDER — ASCORBIC ACID 500 MG
1000 TABLET ORAL DAILY
Status: DISCONTINUED | OUTPATIENT
Start: 2018-03-06 | End: 2018-03-09 | Stop reason: HOSPADM

## 2018-03-06 RX ORDER — MAGNESIUM SULFATE HEPTAHYDRATE 40 MG/ML
2 INJECTION, SOLUTION INTRAVENOUS AS NEEDED
Status: DISCONTINUED | OUTPATIENT
Start: 2018-03-06 | End: 2018-03-07

## 2018-03-06 RX ORDER — DOCUSATE SODIUM 100 MG/1
100 CAPSULE, LIQUID FILLED ORAL EVERY OTHER DAY
Status: ON HOLD | COMMUNITY
End: 2018-03-07

## 2018-03-06 RX ORDER — DOCUSATE SODIUM 100 MG/1
100 CAPSULE, LIQUID FILLED ORAL 2 TIMES DAILY
Status: DISCONTINUED | OUTPATIENT
Start: 2018-03-06 | End: 2018-03-06

## 2018-03-06 RX ORDER — AMLODIPINE BESYLATE 2.5 MG/1
2.5 TABLET ORAL DAILY
COMMUNITY
End: 2018-03-09 | Stop reason: HOSPADM

## 2018-03-06 RX ORDER — SODIUM CHLORIDE 0.9 % (FLUSH) 0.9 %
10 SYRINGE (ML) INJECTION AS NEEDED
Status: DISCONTINUED | OUTPATIENT
Start: 2018-03-06 | End: 2018-03-09 | Stop reason: HOSPADM

## 2018-03-06 RX ORDER — POTASSIUM CHLORIDE 750 MG/1
10 TABLET, FILM COATED, EXTENDED RELEASE ORAL EVERY OTHER DAY
COMMUNITY

## 2018-03-06 RX ORDER — POTASSIUM CHLORIDE 750 MG/1
10 CAPSULE, EXTENDED RELEASE ORAL DAILY
Status: DISCONTINUED | OUTPATIENT
Start: 2018-03-06 | End: 2018-03-07

## 2018-03-06 RX ORDER — ONDANSETRON 2 MG/ML
4 INJECTION INTRAMUSCULAR; INTRAVENOUS EVERY 6 HOURS PRN
Status: DISCONTINUED | OUTPATIENT
Start: 2018-03-06 | End: 2018-03-09 | Stop reason: HOSPADM

## 2018-03-06 RX ORDER — POTASSIUM CHLORIDE 1.5 G/1.77G
40 POWDER, FOR SOLUTION ORAL AS NEEDED
Status: DISCONTINUED | OUTPATIENT
Start: 2018-03-06 | End: 2018-03-09 | Stop reason: HOSPADM

## 2018-03-06 RX ORDER — SODIUM CHLORIDE 9 MG/ML
100 INJECTION, SOLUTION INTRAVENOUS CONTINUOUS
Status: DISCONTINUED | OUTPATIENT
Start: 2018-03-06 | End: 2018-03-06

## 2018-03-06 RX ORDER — POTASSIUM CHLORIDE 7.45 MG/ML
10 INJECTION INTRAVENOUS
Status: DISCONTINUED | OUTPATIENT
Start: 2018-03-06 | End: 2018-03-07 | Stop reason: SDUPTHER

## 2018-03-06 RX ORDER — POTASSIUM CHLORIDE 750 MG/1
40 CAPSULE, EXTENDED RELEASE ORAL AS NEEDED
Status: DISCONTINUED | OUTPATIENT
Start: 2018-03-06 | End: 2018-03-09 | Stop reason: HOSPADM

## 2018-03-06 RX ORDER — CASTOR OIL AND BALSAM, PERU 788; 87 MG/G; MG/G
OINTMENT TOPICAL EVERY 12 HOURS SCHEDULED
Status: DISCONTINUED | OUTPATIENT
Start: 2018-03-06 | End: 2018-03-09 | Stop reason: HOSPADM

## 2018-03-06 RX ORDER — SENNA AND DOCUSATE SODIUM 50; 8.6 MG/1; MG/1
2 TABLET, FILM COATED ORAL NIGHTLY
Status: DISCONTINUED | OUTPATIENT
Start: 2018-03-06 | End: 2018-03-09 | Stop reason: HOSPADM

## 2018-03-06 RX ORDER — DONEPEZIL HYDROCHLORIDE 10 MG/1
10 TABLET, FILM COATED ORAL NIGHTLY
Status: DISCONTINUED | OUTPATIENT
Start: 2018-03-06 | End: 2018-03-09 | Stop reason: HOSPADM

## 2018-03-06 RX ORDER — LORAZEPAM 2 MG/ML
0.25 INJECTION INTRAMUSCULAR ONCE
Status: COMPLETED | OUTPATIENT
Start: 2018-03-06 | End: 2018-03-06

## 2018-03-06 RX ORDER — FENTANYL CITRATE 50 UG/ML
25 INJECTION, SOLUTION INTRAMUSCULAR; INTRAVENOUS ONCE
Status: COMPLETED | OUTPATIENT
Start: 2018-03-06 | End: 2018-03-06

## 2018-03-06 RX ORDER — POTASSIUM CHLORIDE 750 MG/1
40 CAPSULE, EXTENDED RELEASE ORAL AS NEEDED
Status: DISCONTINUED | OUTPATIENT
Start: 2018-03-06 | End: 2018-03-07 | Stop reason: SDUPTHER

## 2018-03-06 RX ORDER — POTASSIUM CHLORIDE 7.45 MG/ML
10 INJECTION INTRAVENOUS
Status: DISCONTINUED | OUTPATIENT
Start: 2018-03-06 | End: 2018-03-06

## 2018-03-06 RX ORDER — POTASSIUM CHLORIDE 1.5 G/1.77G
40 POWDER, FOR SOLUTION ORAL AS NEEDED
Status: DISCONTINUED | OUTPATIENT
Start: 2018-03-06 | End: 2018-03-07 | Stop reason: SDUPTHER

## 2018-03-06 RX ORDER — MULTIPLE VITAMINS W/ MINERALS TAB 9MG-400MCG
1 TAB ORAL DAILY
Status: DISCONTINUED | OUTPATIENT
Start: 2018-03-06 | End: 2018-03-09 | Stop reason: HOSPADM

## 2018-03-06 RX ORDER — BISACODYL 10 MG
10 SUPPOSITORY, RECTAL RECTAL DAILY PRN
Status: DISCONTINUED | OUTPATIENT
Start: 2018-03-06 | End: 2018-03-09 | Stop reason: HOSPADM

## 2018-03-06 RX ORDER — ONDANSETRON 2 MG/ML
4 INJECTION INTRAMUSCULAR; INTRAVENOUS ONCE
Status: COMPLETED | OUTPATIENT
Start: 2018-03-06 | End: 2018-03-06

## 2018-03-06 RX ORDER — MAGNESIUM CARB/ALUMINUM HYDROX 105-160MG
150 TABLET,CHEWABLE ORAL ONCE
Status: COMPLETED | OUTPATIENT
Start: 2018-03-06 | End: 2018-03-06

## 2018-03-06 RX ORDER — CARBIDOPA/LEVODOPA 25MG-250MG
2 TABLET ORAL DAILY
COMMUNITY

## 2018-03-06 RX ORDER — AMPICILLIN TRIHYDRATE 500 MG
450 CAPSULE ORAL DAILY
COMMUNITY

## 2018-03-06 RX ORDER — ACETAMINOPHEN 325 MG/1
650 TABLET ORAL EVERY 4 HOURS PRN
Status: DISCONTINUED | OUTPATIENT
Start: 2018-03-06 | End: 2018-03-09 | Stop reason: HOSPADM

## 2018-03-06 RX ORDER — MAGNESIUM SULFATE HEPTAHYDRATE 40 MG/ML
4 INJECTION, SOLUTION INTRAVENOUS AS NEEDED
Status: DISCONTINUED | OUTPATIENT
Start: 2018-03-06 | End: 2018-03-07

## 2018-03-06 RX ORDER — CARBIDOPA/LEVODOPA 25MG-250MG
1 TABLET ORAL 2 TIMES DAILY
COMMUNITY

## 2018-03-06 RX ORDER — ASPIRIN 81 MG/1
81 TABLET ORAL DAILY
Status: DISCONTINUED | OUTPATIENT
Start: 2018-03-06 | End: 2018-03-09 | Stop reason: HOSPADM

## 2018-03-06 RX ADMIN — BUDESONIDE AND FORMOTEROL FUMARATE DIHYDRATE 2 PUFF: 160; 4.5 AEROSOL RESPIRATORY (INHALATION) at 20:06

## 2018-03-06 RX ADMIN — DONEPEZIL HYDROCHLORIDE 10 MG: 10 TABLET, FILM COATED ORAL at 21:55

## 2018-03-06 RX ADMIN — OXYCODONE HYDROCHLORIDE AND ACETAMINOPHEN 1000 MG: 500 TABLET ORAL at 08:50

## 2018-03-06 RX ADMIN — IPRATROPIUM BROMIDE 0.5 MG: 0.5 SOLUTION RESPIRATORY (INHALATION) at 20:05

## 2018-03-06 RX ADMIN — BUDESONIDE AND FORMOTEROL FUMARATE DIHYDRATE 2 PUFF: 160; 4.5 AEROSOL RESPIRATORY (INHALATION) at 09:37

## 2018-03-06 RX ADMIN — HEPARIN SODIUM 5000 UNITS: 5000 INJECTION, SOLUTION INTRAVENOUS; SUBCUTANEOUS at 13:43

## 2018-03-06 RX ADMIN — FUROSEMIDE 20 MG: 20 TABLET ORAL at 08:49

## 2018-03-06 RX ADMIN — ASPIRIN 81 MG: 81 TABLET, COATED ORAL at 08:49

## 2018-03-06 RX ADMIN — Medication 2 TABLET: at 21:55

## 2018-03-06 RX ADMIN — CARBIDOPA AND LEVODOPA 1 TABLET: 25; 100 TABLET ORAL at 17:10

## 2018-03-06 RX ADMIN — POTASSIUM CHLORIDE 40 MEQ: 750 CAPSULE, EXTENDED RELEASE ORAL at 18:40

## 2018-03-06 RX ADMIN — SODIUM CHLORIDE 100 ML/HR: 9 INJECTION, SOLUTION INTRAVENOUS at 06:20

## 2018-03-06 RX ADMIN — SODIUM CHLORIDE 500 ML: 9 INJECTION, SOLUTION INTRAVENOUS at 03:19

## 2018-03-06 RX ADMIN — CARBIDOPA AND LEVODOPA 1 TABLET: 25; 100 TABLET ORAL at 08:49

## 2018-03-06 RX ADMIN — AMLODIPINE BESYLATE 5 MG: 5 TABLET ORAL at 08:50

## 2018-03-06 RX ADMIN — HEPARIN SODIUM 5000 UNITS: 5000 INJECTION, SOLUTION INTRAVENOUS; SUBCUTANEOUS at 06:33

## 2018-03-06 RX ADMIN — IOPAMIDOL 70 ML: 755 INJECTION, SOLUTION INTRAVENOUS at 03:35

## 2018-03-06 RX ADMIN — CARBIDOPA AND LEVODOPA 1 TABLET: 25; 100 TABLET ORAL at 12:06

## 2018-03-06 RX ADMIN — LORAZEPAM 0.25 MG: 2 INJECTION INTRAMUSCULAR; INTRAVENOUS at 03:21

## 2018-03-06 RX ADMIN — DOCUSATE SODIUM 100 MG: 100 CAPSULE, LIQUID FILLED ORAL at 08:49

## 2018-03-06 RX ADMIN — HEPARIN SODIUM 5000 UNITS: 5000 INJECTION, SOLUTION INTRAVENOUS; SUBCUTANEOUS at 21:55

## 2018-03-06 RX ADMIN — IPRATROPIUM BROMIDE 0.5 MG: 0.5 SOLUTION RESPIRATORY (INHALATION) at 13:00

## 2018-03-06 RX ADMIN — POTASSIUM CHLORIDE 10 MEQ: 750 CAPSULE, EXTENDED RELEASE ORAL at 08:50

## 2018-03-06 RX ADMIN — ONDANSETRON 4 MG: 2 INJECTION INTRAMUSCULAR; INTRAVENOUS at 01:01

## 2018-03-06 RX ADMIN — Medication 150 ML: at 17:10

## 2018-03-06 RX ADMIN — LISINOPRIL 10 MG: 10 TABLET ORAL at 08:50

## 2018-03-06 RX ADMIN — CASTOR OIL AND BALSAM, PERU: 788; 87 OINTMENT TOPICAL at 08:51

## 2018-03-06 RX ADMIN — SERTRALINE HYDROCHLORIDE 50 MG: 50 TABLET ORAL at 08:50

## 2018-03-06 RX ADMIN — POTASSIUM CHLORIDE 40 MEQ: 750 CAPSULE, EXTENDED RELEASE ORAL at 14:49

## 2018-03-06 RX ADMIN — FENTANYL CITRATE 25 MCG: 50 INJECTION INTRAMUSCULAR; INTRAVENOUS at 03:20

## 2018-03-06 RX ADMIN — IPRATROPIUM BROMIDE 0.5 MG: 0.5 SOLUTION RESPIRATORY (INHALATION) at 08:19

## 2018-03-06 RX ADMIN — MULTIPLE VITAMINS W/ MINERALS TAB: TAB ORAL at 08:50

## 2018-03-06 RX ADMIN — POLYETHYLENE GLYCOL (3350) 17 G: 17 POWDER, FOR SOLUTION ORAL at 14:49

## 2018-03-06 RX ADMIN — CASTOR OIL AND BALSAM, PERU: 788; 87 OINTMENT TOPICAL at 21:55

## 2018-03-06 RX ADMIN — MICONAZOLE NITRATE 1 APPLICATION: 2 CREAM TOPICAL at 08:50

## 2018-03-06 NOTE — H&P
"    [facility] Medicine Services  HISTORY AND PHYSICAL    Patient Name: Ana Cleary  : 7/3/1926  MRN: [id number]  Primary Care Physician: Kasey Bethea MD    Subjective   Subjective     Chief Complaint:  abd pain    HPI:  Ana Cleary is a 89 y.o. female who is uncertain why she is here or how she got here.  Says she has trouble going to the bathroom.  Thinks last bm was 2 days ago.  Pt doesn't think she has had abd pain but this is reportedly why she is here.  Pt thinks something fell on her or she swallowed something.  Pt w/ documented hx of constipation.  Gets short of breath \"if exercise too hard\".  Unable to provide further hx.      Review of Systems         Otherwise 10-system ROS reviewed and is negative except as mentioned in the HPI.    Personal History     Past Medical History:   Diagnosis Date   • Carotid artery disease    • Constipation    • Decubitus ulcer of buttock    • Dementia    • Fall at nursing home    • Falls frequently    • Hypertension    • Osteoarthritis    • Sciatica    • Stroke        Past Surgical History:   Procedure Laterality Date   • APPENDECTOMY     • CAROTID ENDARTERECTOMY Left    • HYSTERECTOMY         Family History: Family history is unknown by patient.     Social History:  reports that she has never smoked. She has never used smokeless tobacco. She reports that she does not drink alcohol or use illicit drugs.  Social History     Social History Narrative    Patient consumes 1-2 servings coffee/tea daily.     Patient lives at [place], [place].            Medications:    (Not in a hospital admission)    No Known Allergies    Objective   Objective     Vital Signs:   Temp:  [97.3 °F (36.3 °C)] 97.3 °F (36.3 °C)  Heart Rate:  [] 100  Resp:  [22] 22  BP: (147)/(83) 147/83        Physical Exam    gen; pleasantly confused, alert  Heent; perrla, eomi, mmm  Cv; rr w/ tachycardia  L; ctab, no wheeze/crackles  Abd; soft, +bs, ntnd, no " r/g  Ext; no cce, 2+ pulses  Skin; cdi, warm  Neuro; grossly intact  Psych; pleasantly confused      Results Reviewed:  I have personally reviewed current lab, radiology, and data and agree.      Results from last 7 days  Lab Units 03/06/18  0100   WBC 10*3/mm3 8.87   HEMOGLOBIN g/dL 13.4   HEMATOCRIT % 42.5   PLATELETS 10*3/mm3 287       Results from last 7 days  Lab Units 03/06/18  0100   SODIUM mmol/L 135   POTASSIUM mmol/L 3.4*   CHLORIDE mmol/L 98*   CO2 mmol/L 22.0   BUN mg/dL 16   CREATININE mg/dL 1.10   GLUCOSE mg/dL 91   CALCIUM mg/dL 9.6   ALT (SGPT) U/L 6*   AST (SGOT) U/L 12     Estimated Creatinine Clearance: 26.1 mL/min (by C-G formula based on Cr of 1.1).  Brief Urine Lab Results  (Last result in the past 365 days)      Color   Clarity   Blood   Leuk Est   Nitrite   Protein   CREAT   Urine HCG        03/06/18 0116 Yellow Clear Negative Trace(A) Negative Negative             No results found for: BNP  No results found for: PHART  Imaging Results (last 24 hours)     Procedure Component Value Units Date/Time    XR Abdomen 2 View With Chest 1 View [[id number]] Collected:  03/06/18 0026     Updated:  03/06/18 0150    Narrative:       EXAM:    XR Abdomen 2 Views With XR Chest    CLINICAL HISTORY:    89 years old, female; Pain; Abdominal pain; Generalized; Patient HX: Patient   complains of entire abdominal pain and distention patient's family member   states that the patient was seen here at [facility] on 2/28/2018   for the same problem and it has not resolved. HX: Non-hospital hypertension   carotid artery disease dementia falls frequently hypotension due to drugs;   Additional info: Abd pain    TECHNIQUE:    Frontal view of the chest, frontal view of the abdomen/pelvis and upright or   decubitus view of the abdomen.    COMPARISON:    CR - XR CHEST 1 VW 2018-02-28 22:16    FINDINGS:    Lungs:  There is pulmonary granulomatous scarring.    Pleural space:  Unremarkable.  No pneumothorax.     Heart:  Unremarkable.  No cardiomegaly.    Mediastinum:  Unremarkable.    Intraperitoneal space:  No free air.    Gastrointestinal tract:  Unremarkable.  No dilation.    Bones/joints:  There is scoliosis with degenerative changes of the spine.      Impression:         No acute findings.    THIS DOCUMENT HAS BEEN ELECTRONICALLY SIGNED BY TYRESE LE MD    CT Abdomen Pelvis With Contrast [[id number]] Updated:  03/06/18 0344    CT Angiogram Chest With Contrast [[id number]] Collected:  03/06/18 0259     Updated:  03/06/18 0431    Narrative:       EXAM:    CT Angiography Chest With Intravenous Contrast    CLINICAL HISTORY:    89 years old, female; Acidosis; Pressure ulcer of right buttock, stage 1;   Unspecified abdominal pain; Signs and symptoms; Shortness of breath; Additional   info: Chest pain, SOA    TECHNIQUE:    Axial computed tomographic angiography images of the chest with intravenous   contrast using pulmonary embolism protocol.  All CT scans at this facility use   one or more dose reduction techniques, viz.: automated exposure control; ma/kV   adjustment per patient size (including targeted exams where dose is matched to   indication; i.e. head); or iterative reconstruction technique.    MIP reconstructed images were created and reviewed.    CONTRAST:    40 mL of isovue 370 administered intravenously.    COMPARISON:    CT CHEST W WO CONTRAST 2017-08-31 11:08    FINDINGS:    Pulmonary arteries:  No pulmonary embolism.    Aorta:  Moderate atherosclerotic changes.  No thoracic aortic aneurysm.    Lungs:  Calcified lingular granuloma.  Hazy areas of groundglass opacity in   the right midlung field, decreased.  Scarring medially in the right lung base,   unchanged.  Atelectasis medially in the left lower lobe with bronchial crowding   and areas of groundglass attenuation and linear density laterally, unchanged.    Mosaic attenuation in the left apex suggestive of air trapping, unchanged.  No   appreciable mass or  acute air space disease.    Pleural space:  No significant effusion.  No pneumothorax.    Heart:  Coronary calcifications.  No significant pericardial effusion.    Mediastinum: Small hiatal hernia.    Bones/joints:  No acute fracture.  No dislocation.    Soft tissues:  Unremarkable.    Lymph nodes:  Calcified prevascular/AP window lymph nodes, unchanged.      Impression:         No definite acute findings.  Bibasilar scarring with partial atelectasis in   the left lower lobe, unchanged.  Decreased areas of groundglass attenuation in   the right midlung field may represent postinflammatory change.    THIS DOCUMENT HAS BEEN ELECTRONICALLY SIGNED BY CRISTINA HUTTON MD             Assessment/Plan   Assessment / Plan     Hospital Problem List     Hypertension    Dementia    Falls frequently    Abdominal pain    Lactic acidosis    Constipation            Assessment & Plan:  89 y/o pleasantly demented female who is a resident of [facility] here w/  1. ?abd pain; lactic acid minimally elevated. A/pct pending.  Exam benign.  Hx of constipation.  Will give bowel regimen and ivf's and await ct findings.  2. Htn; stable  3. Hypokalemia; replace  4. Falls/ataxia  5. Dementia; ?baseline  Suspect pt will be able to return to NH today pending no concerning findings on CT.     DVT prophylaxis:scd/teds    CODE STATUS:  No Order DNR per NH    Admission Status:  I believe this patient meets  OBSERVATION status, however if further evaluation or treatment plans warrant, status may change.  Based upon current information, I predict patient's care encounter to be less than or equal to 2 midnights.    Electronically signed by Tiffany Kerns MD, 03/06/18, 3:57 AM.

## 2018-03-06 NOTE — PLAN OF CARE
Problem: Patient Care Overview (Adult)  Goal: Plan of Care Review  Outcome: Ongoing (interventions implemented as appropriate)   03/06/18 1144   Coping/Psychosocial Response Interventions   Plan Of Care Reviewed With patient   Patient Care Overview   Progress progress toward functional goals is gradual   Outcome Evaluation   Outcome Summary/Follow up Plan Patient able to participate with PT by mobilizing with walker. She requires assist with all mobility for safety 2 to weakness and cognitive status. She will benefit from continued skilled PT       Problem: Inpatient Physical Therapy  Goal: Bed Mobility Goal LTG- PT  Outcome: Ongoing (interventions implemented as appropriate)   03/06/18 1144   Bed Mobility PT LTG   Bed Mobility PT LTG, Date Established 03/06/18   Bed Mobility PT LTG, Time to Achieve 5 - 7 days   Bed Mobility PT LTG, Activity Type supine to sit/sit to supine   Bed Mobility PT LTG, Mayes Level conditional independence   Bed Mobility PT Goal LTG, Assist Device bed rails   Bed Mobility PT LTG, Outcome goal ongoing     Goal: Transfer Training Goal 1 LTG- PT  Outcome: Ongoing (interventions implemented as appropriate)   03/06/18 1144   Transfer Training PT LTG   Transfer Training PT LTG, Date Established 03/06/18   Transfer Training PT LTG, Time to Achieve 5 - 7 days   Transfer Training PT LTG, Activity Type sit to stand/stand to sit;bed to chair /chair to bed   Transfer Training PT LTG, Mayes Level contact guard assist   Transfer Training PT LTG, Assist Device walker, rolling   Transfer Training PT LTG, Outcome goal ongoing     Goal: Gait Training Goal LTG- PT  Outcome: Ongoing (interventions implemented as appropriate)   03/06/18 1144   Gait Training PT LTG   Gait Training Goal PT LTG, Date Established 03/06/18   Gait Training Goal PT LTG, Time to Achieve 5 - 7 days   Gait Training Goal PT LTG, Mayes Level contact guard assist   Gait Training Goal PT LTG, Assist Device walker,  rolling   Gait Training Goal PT LTG, Distance to Achieve 150   Gait Training Goal PT LTG, Outcome goal ongoing

## 2018-03-06 NOTE — PLAN OF CARE
Problem: Patient Care Overview (Adult)  Goal: Plan of Care Review  Outcome: Ongoing (interventions implemented as appropriate)   03/06/18 1620   Coping/Psychosocial Response Interventions   Plan Of Care Reviewed With patient;family   Patient Care Overview   Progress improving   Outcome Evaluation   Outcome Summary/Follow up Plan pt up to chair today and taco well, pt on 2L NC taco well keeping sats above 95%, pt confused oriented to person only      Goal: Adult Individualization and Mutuality  Outcome: Ongoing (interventions implemented as appropriate)    Goal: Discharge Needs Assessment  Outcome: Ongoing (interventions implemented as appropriate)      Problem: Skin Integrity Impairment, Risk/Actual (Adult)  Goal: Identify Related Risk Factors and Signs and Symptoms  Outcome: Ongoing (interventions implemented as appropriate)    Goal: Skin Integrity/Wound Healing  Outcome: Ongoing (interventions implemented as appropriate)

## 2018-03-06 NOTE — ED PROVIDER NOTES
Subjective   Ana Cleary is a 89 y.o.female who presents to the ED with complaints of abdominal pain with onset 1100 this morning. The patient's history was limited due to patient's dementia. She currently complains of generalized abdominal pain that radiates to her lower chest. The patients power of santi is her daughter who is currently out of town. The patient's grandson state that patient has some trouble moving her bowel movements. There are no other known complaints at this time. Additionally, the patient was recently seen here at [facility] for a similar complaint last week.          Patient is a 89 y.o. female presenting with abdominal pain.   History provided by:  Relative  History limited by:  Dementia  Abdominal Pain   Pain location:  Generalized  Pain quality: aching    Pain radiates to:  Chest  Pain severity:  Moderate  Onset quality:  Sudden  Timing:  Constant  Progression:  Unchanged  Chronicity:  Recurrent  Relieved by:  None tried  Worsened by:  Nothing  Ineffective treatments:  None tried  Associated symptoms: constipation (minor)    Risk factors: being elderly        Review of Systems   Unable to perform ROS: Dementia   Gastrointestinal: Positive for abdominal pain and constipation (minor).       Past Medical History:   Diagnosis Date   • Carotid artery disease    • Constipation    • Decubitus ulcer of buttock    • Dementia    • Fall at nursing home    • Falls frequently    • Hypertension    • Osteoarthritis    • Sciatica    • Stroke        No Known Allergies    Past Surgical History:   Procedure Laterality Date   • APPENDECTOMY     • CAROTID ENDARTERECTOMY Left    • HYSTERECTOMY         Family History   Problem Relation Age of Onset   • Family history unknown: Yes       Social History     Social History   • Marital status:      Social History Main Topics   • Smoking status: Never Smoker   • Smokeless tobacco: Never Used   • Alcohol use No   • Drug use: No   • Sexual activity: Defer      Social History Narrative    Patient consumes 1-2 servings coffee/tea daily.     Patient lives at [place], [place].              Objective   Physical Exam   Constitutional: She appears well-developed and well-nourished.   HENT:   Head: Normocephalic and atraumatic.   Nose: Nose normal.   Eyes: Conjunctivae are normal. No scleral icterus.   Neck: Normal range of motion. Neck supple.   Cardiovascular: Normal rate and regular rhythm.    Pulmonary/Chest: Effort normal and breath sounds normal. No respiratory distress.   Abdominal: Soft. There is no tenderness.   Genitourinary:   Genitourinary Comments: No rectal prolapse or impaction. Possible stage 1 Pressure ulcer right buttock. Upon rectal examination, there was normal brown appearing stool present.   Musculoskeletal: Normal range of motion. She exhibits no tenderness.   Skin: Skin is warm and dry.   Nursing note and vitals reviewed.      Procedures         ED Course  ED Course   Comment By Time   I have spoken with Dr. Kerns, hospitalist, who agrees to admit the patient. - JEWELS Gramajo 03/06 0337       Recent Results (from the past 24 hour(s))   Comprehensive Metabolic Panel    Collection Time: 03/06/18  1:00 AM   Result Value Ref Range    Glucose 91 70 - 100 mg/dL    BUN 16 9 - 23 mg/dL    Creatinine 1.10 0.60 - 1.30 mg/dL    Sodium 135 132 - 146 mmol/L    Potassium 3.4 (L) 3.5 - 5.5 mmol/L    Chloride 98 (L) 99 - 109 mmol/L    CO2 22.0 20.0 - 31.0 mmol/L    Calcium 9.6 8.7 - 10.4 mg/dL    Total Protein 8.3 (H) 5.7 - 8.2 g/dL    Albumin 4.60 3.20 - 4.80 g/dL    ALT (SGPT) 6 (L) 7 - 40 U/L    AST (SGOT) 12 0 - 33 U/L    Alkaline Phosphatase 82 25 - 100 U/L    Total Bilirubin 0.9 0.3 - 1.2 mg/dL    eGFR Non African Amer 47 (L) >60 mL/min/1.73    Globulin 3.7 gm/dL    A/G Ratio 1.2 (L) 1.5 - 2.5 g/dL    BUN/Creatinine Ratio 14.5 7.0 - 25.0    Anion Gap 15.0 (H) 3.0 - 11.0 mmol/L   Lipase    Collection Time: 03/06/18  1:00 AM   Result Value Ref Range     Lipase 61 (H) 6 - 51 U/L   Lactic Acid, Plasma    Collection Time: 03/06/18  1:00 AM   Result Value Ref Range    Lactate 2.4 (C) 0.5 - 2.0 mmol/L   Light Blue Top    Collection Time: 03/06/18  1:00 AM   Result Value Ref Range    Extra Tube hold for add-on    Green Top (Gel)    Collection Time: 03/06/18  1:00 AM   Result Value Ref Range    Extra Tube Hold for add-ons.    Lavender Top    Collection Time: 03/06/18  1:00 AM   Result Value Ref Range    Extra Tube hold for add-on    Gold Top - SST    Collection Time: 03/06/18  1:00 AM   Result Value Ref Range    Extra Tube Hold for add-ons.    CBC Auto Differential    Collection Time: 03/06/18  1:00 AM   Result Value Ref Range    WBC 8.87 3.50 - 10.80 10*3/mm3    RBC 4.68 3.89 - 5.14 10*6/mm3    Hemoglobin 13.4 11.5 - 15.5 g/dL    Hematocrit 42.5 34.5 - 44.0 %    MCV 90.8 80.0 - 99.0 fL    MCH 28.6 27.0 - 31.0 pg    MCHC 31.5 (L) 32.0 - 36.0 g/dL    RDW 15.7 (H) 11.3 - 14.5 %    RDW-SD 52.0 37.0 - 54.0 fl    MPV 12.1 (H) 6.0 - 12.0 fL    Platelets 287 150 - 450 10*3/mm3    Neutrophil % 63.6 41.0 - 71.0 %    Lymphocyte % 23.1 (L) 24.0 - 44.0 %    Monocyte % 11.7 0.0 - 12.0 %    Eosinophil % 1.1 0.0 - 3.0 %    Basophil % 0.2 0.0 - 1.0 %    Immature Grans % 0.3 0.0 - 0.6 %    Neutrophils, Absolute 5.63 1.50 - 8.30 10*3/mm3    Lymphocytes, Absolute 2.05 0.60 - 4.80 10*3/mm3    Monocytes, Absolute 1.04 (H) 0.00 - 1.00 10*3/mm3    Eosinophils, Absolute 0.10 0.00 - 0.30 10*3/mm3    Basophils, Absolute 0.02 0.00 - 0.20 10*3/mm3    Immature Grans, Absolute 0.03 0.00 - 0.03 10*3/mm3   POC Troponin, Rapid    Collection Time: 03/06/18  1:05 AM   Result Value Ref Range    Troponin I 0.00 0.00 - 0.07 ng/mL   Urinalysis With / Microscopic If Indicated - Urine, Catheter    Collection Time: 03/06/18  1:16 AM   Result Value Ref Range    Color, UA Yellow Yellow, Straw    Appearance, UA Clear Clear    pH, UA 6.0 5.0 - 8.0    Specific Gravity, UA 1.009 1.001 - 1.030    Glucose, UA Negative  "Negative    Ketones, UA Negative Negative    Bilirubin, UA Negative Negative    Blood, UA Negative Negative    Protein, UA Negative Negative    Leuk Esterase, UA Trace (A) Negative    Nitrite, UA Negative Negative    Urobilinogen, UA 0.2 E.U./dL 0.2 - 1.0 E.U./dL   Urinalysis, Microscopic Only - Urine, Clean Catch    Collection Time: 03/06/18  1:16 AM   Result Value Ref Range    RBC, UA 0-2 None Seen, 0-2 /HPF    WBC, UA 3-5 (A) None Seen, 0-2 /HPF    Bacteria, UA None Seen None Seen, Trace /HPF    Squamous Epithelial Cells, UA 3-6 (A) None Seen, 0-2 /HPF    Hyaline Casts, UA 0-6 0 - 6 /LPF    Methodology Automated Microscopy      Note: In addition to lab results from this visit, the labs listed above may include labs taken at another facility or during a different encounter within the last 24 hours. Please correlate lab times with ED admission and discharge times for further clarification of the services performed during this visit.    CT Abdomen Pelvis With Contrast   Final Result     No acute findings.      THIS DOCUMENT HAS BEEN ELECTRONICALLY SIGNED BY CRISTINA HUTTON MD      CT Angiogram Chest With Contrast   Final Result     No definite acute findings.  Bibasilar scarring with partial atelectasis in    the left lower lobe, unchanged.  Decreased areas of groundglass attenuation in    the right midlung field may represent postinflammatory change.      THIS DOCUMENT HAS BEEN ELECTRONICALLY SIGNED BY CRISTINA HUTTON MD      XR Abdomen 2 View With Chest 1 View   Final Result     No acute findings.      THIS DOCUMENT HAS BEEN ELECTRONICALLY SIGNED BY TYRESE LE MD        Vitals:    03/05/18 2345 03/05/18 2350 03/05/18 2352 03/06/18 0116   BP:   147/83    Pulse:  99  100   Resp: 22      Temp:  97.3 °F (36.3 °C)     TempSrc:  Axillary     SpO2:  97%  92%   Weight:  55.8 kg (123 lb)     Height:  152.4 cm (60\")       Medications   sodium chloride 0.9 % flush 10 mL (not administered)   sodium chloride 0.9 % bolus 500 mL " (500 mL Intravenous New Bag 3/6/18 0319)   ondansetron (ZOFRAN) injection 4 mg (4 mg Intravenous Given 3/6/18 0101)   fentaNYL citrate (PF) (SUBLIMAZE) injection 25 mcg (25 mcg Intravenous Given 3/6/18 0320)   LORazepam (ATIVAN) injection 0.25 mg (0.25 mg Intravenous Given 3/6/18 0321)     ECG/EMG Results (last 24 hours)     Procedure Component Value Units Date/Time    ECG 12 Lead [[id number]] Collected:  03/06/18 0040     Updated:  03/06/18 0040                      MDM    Final diagnoses:   Abdominal pain, unspecified abdominal location   Lactic acidosis   Decubitus ulcer of right buttock, stage 1       Documentation assistance provided by brayden Gramajo.  Information recorded by the brayden was done at my direction and has been verified and validated by me.     Aldo Gramajo  03/06/18 0046       Aldo Gramajo  03/06/18 0333       Austin Kingsley DO  03/10/18 5883

## 2018-03-06 NOTE — PROGRESS NOTES
Discharge Planning Assessment  [facility]     Patient Name: Ana Cleary  MRN: [id number]  Today's Date: 3/6/2018    Admit Date: 3/5/2018          Discharge Needs Assessment       03/06/18 1552    Living Environment    Lives With other (see comments);alone   [facility] - memory care unit    Living Arrangements assisted living    Transportation Available car;family or friend will provide    Living Environment    Provides Primary Care For no one    Quality Of Family Relationships helpful;involved;supportive    Able to Return to Prior Living Arrangements yes    Discharge Needs Assessment    Concerns To Be Addressed denies needs/concerns at this time    Readmission Within The Last 30 Days no previous admission in last 30 days    Anticipated Changes Related to Illness none    Equipment Currently Used at Home walker, standard    Equipment Needed After Discharge none    Discharge Disposition other (see comments)   [facility]     Discharge Planning Comments back to [facility]            Discharge Plan       03/06/18 1501    Case Management/Social Work Plan    Plan Back to [facility]    Patient/Family In Agreement With Plan yes    Additional Comments Ms. Hooper lives a [facility] in the Memory Care Unit. Spoke with grandson at bedside. Plan is to return back to facility tomorrow on 3/7, he will transport and is agreeable to this plan. He does state he and the family have concerns about Ms. Cleary being sent to hospital abruptly by facility. Encouraged him to discuss with the facility their wishes and expectations. STEPHANIE spoke with Debra at [facility] and states they only need a transfer summary, hard Rx's for ANY new medications and report to be called. Passed along to Debra the family's concerns and she states she will discuss with them. Nurse will need to call report to [phone]. CM to fax discharge summary to [phone]. Updated primary nurse. No other concerns at this  time.         Discharge Placement     No information found        Expected Discharge Date and Time     Expected Discharge Date Expected Discharge Time    Mar 7, 2018               Demographic Summary       03/06/18 1554    Referral Information    Admission Type observation    Arrived From nursing facility    Referral Source admission list;physician    Reason For Consult discharge planning    Record Reviewed medical record;history and physical;clinical discipline documentation    Contact Information    Permission Granted to Share Information With ;family/designee    Primary Care Physician Information    Name [facility]            Functional Status       03/06/18 1555    Functional Status Current    Ambulation 2-->assistive person    Transferring 2-->assistive person    Toileting 2-->assistive person    Bathing 2-->assistive person    Dressing 2-->assistive person    Eating 0-->independent    Communication 2-->difficulty understanding (not related to language barrier)    Swallowing (if score 2 or more for any item, consult Rehab Services) 0-->swallows foods/liquids without difficulty    Change in Functional Status Since Onset of Current Illness/Injury yes    Functional Status Prior    Ambulation 2-->assistive person    Transferring 2-->assistive person    Toileting 2-->assistive person    Bathing 2-->assistive person    Dressing 2-->assistive person    Eating 0-->independent    Communication 2-->difficulty understanding (not related to language barrier)    Swallowing 0-->swallows foods/liquids without difficulty    IADL    Medications completely dependent    Meal Preparation completely dependent    Housekeeping completely dependent    Laundry completely dependent    Shopping completely dependent    Oral Care assistive person    Activity Tolerance    Current Activity Limitations none    Usual Activity Tolerance fair    Current Activity Tolerance poor    Cognitive/Perceptual/Developmental    Current  Mental Status/Cognitive Functioning unable to assess    Recent Changes in Mental Status/Cognitive Functioning unable to assess    Employment/Financial    Financial Concerns none            Psychosocial     None            Abuse/Neglect     None            Legal     None            Substance Abuse     None            Patient Forms     None          Ximena Hitchcock RN

## 2018-03-06 NOTE — THERAPY EVALUATION
Acute Care - Physical Therapy Initial Evaluation  [facility]     Patient Name: Ana Cleary  : 7/3/1926  MRN: [id number]  Today's Date: 3/6/2018   Onset of Illness/Injury or Date of Surgery Date: 18  Date of Referral to PT: 18  Referring Physician: Claudette BAPTISTE      Admit Date: 3/5/2018     Visit Dx:    ICD-10-CM ICD-9-CM   1. Abdominal pain, unspecified abdominal location R10.9 789.00   2. Lactic acidosis E87.2 276.2   3. Decubitus ulcer of right buttock, stage 1 L89.311 707.05     707.21   4. Impaired functional mobility, balance, gait, and endurance Z74.09 V49.89     Patient Active Problem List   Diagnosis   • Hypertension   • Carotid artery disease   • Dementia   • Falls frequently   • Hypotension due to drugs   • Abdominal pain   • Lactic acidosis   • Constipation     Past Medical History:   Diagnosis Date   • Carotid artery disease    • Constipation    • Decubitus ulcer of buttock    • Dementia    • Fall at nursing home    • Falls frequently    • Hypertension    • Osteoarthritis    • Sciatica    • Stroke      Past Surgical History:   Procedure Laterality Date   • APPENDECTOMY     • CAROTID ENDARTERECTOMY Left    • HYSTERECTOMY            PT ASSESSMENT (last 72 hours)      PT Evaluation       18 0845 18 0439    Rehab Evaluation    Document Type evaluation  -SC     Subjective Information agree to therapy  -SC     Patient Effort, Rehab Treatment adequate  -SC     Symptoms Noted During/After Treatment none  -SC     General Information    Patient Profile Review yes  -SC     Onset of Illness/Injury or Date of Surgery Date 18  -SC     Referring Physician Claudette BAPTISTE  -SC     General Observations in bed  -SC     Pertinent History Of Current Problem Admitted from [facility] with abdominal pain and no BM .   -SC     Precautions/Limitations fall precautions  -SC     Prior Level of Function --   unknown  -SC     Equipment Currently Used at Home  walker, standard  -BM    Plans/Goals  Discussed With patient;agreed upon  -SC     Risks Reviewed patient:;increased discomfort  -SC     Benefits Reviewed patient:;increase independence  -SC     Barriers to Rehab cognitive status  -SC     Living Environment    Lives With facility resident  -SC facility resident  -BM    Living Arrangements residential facility  -SC residential facility  -BM    Home Accessibility no concerns  -SC no concerns  -BM    Stair Railings at Home  none  -BM    Type of Financial/Environmental Concern  none  -BM    Transportation Available  car;ambulance;family or friend will provide  -    Living Environment Comment  facility resident   -BM    Clinical Impression    Date of Referral to PT 03/06/18  -SC     PT Diagnosis impaired mobility  -SC     Prognosis good  -SC     Patient/Family Goals Statement unable to verbalize this  -SC     Criteria for Skilled Therapeutic Interventions Met yes;treatment indicated  -SC     Pathology/Pathophysiology Noted (Describe Specifically for Each System) musculoskeletal;neuromuscular  -SC     Impairments Found (describe specific impairments) motor function;gait, locomotion, and balance;muscle performance  -SC     Rehab Potential good, to achieve stated therapy goals  -SC     Vital Signs    Post Systolic BP Rehab 162  -SC     Post Treatment Diastolic BP 82  -SC     Pain Assessment    Pain Assessment No/denies pain  -SC     Vision Assessment/Intervention    Visual Impairment WFL  -SC     Cognitive Assessment/Intervention    Current Cognitive/Communication Assessment impaired  -SC     Orientation Status oriented to;person  -SC     Follows Commands/Answers Questions 75% of the time;able to follow single-step instructions;needs increased time;needs cueing;needs repetition  -SC     Personal Safety severe impairment;decreased awareness, need for assist;decreased awareness, need for safety;decreased insight to deficits  -SC     Personal Safety Interventions gait belt;fall prevention program maintained  -SC      ROM (Range of Motion)    General ROM no range of motion deficits identified  -SC     MMT (Manual Muscle Testing)    General MMT Assessment lower extremity strength deficits identified  -SC     General MMT Assessment Detail grossly 4/5  -SC     Muscle Tone Assessment    Muscle Tone Assessment --   some extension tone in trunk noted  -SC     Bed Mobility, Assessment/Treatment    Bed Mobility, Assistive Device bed rails;head of bed elevated;draw sheet  -SC     Bed Mobility, Scoot/Bridge, [place] minimum assist (75% patient effort);verbal cues required  -SC     Bed Mob, Supine to Sit, [place] moderate assist (50% patient effort);verbal cues required  -SC     Bed Mobility, Safety Issues cognitive deficits limit understanding;impaired trunk control for bed mobility  -SC     Bed Mobility, Impairments motor control impaired;postural control impaired;impaired balance;coordination impaired  -SC     Bed Mobility, Comment up to edge of bed with repeted cues for sequencing  . Required assist   leans postieriorly on edge of bed- corrects with cueing  -SC     Transfer Assessment/Treatment    Transfers, Sit-Stand [place] minimum assist (75% patient effort);verbal cues required  -SC     Transfers, Stand-Sit [place] minimum assist (75% patient effort);verbal cues required  -SC     Transfers, Sit-Stand-Sit, Assist Device rolling walker  -SC     Transfer, Safety Issues balance decreased during turns;sequencing ability decreased  -SC     Transfer, Impairments impaired balance;coordination impaired;postural control impaired;strength decreased  -SC     Transfer, Comment cues for sequencing  -SC     Gait Assessment/Treatment    Gait, [place] Level minimum assist (75% patient effort)  -SC     Gait, Assistive Device rolling walker  -SC     Gait, Distance (Feet) 40  -SC     Gait, Gait Pattern Analysis swing-through gait  -SC     Gait, Gait Deviations stride length decreased;forward flexed posture;other (see  comments);humphrey decreased  -SC     Gait, Safety Issues sequencing ability decreased;step length decreased;balance decreased during turns  -SC     Gait, Impairments impaired balance;coordination impaired;motor control impaired;postural control impaired  -SC     Gait, Comment cues for staying close to walker. Needed assist to push walker and turn walker  -SC     Therapy Exercises    Bilateral Lower Extremities AROM:;10 reps;sitting;ankle pumps/circles;LAQ  -SC     Positioning and Restraints    Pre-Treatment Position in bed  -SC     Post Treatment Position chair  -SC     In Chair sitting;call light within reach;encouraged to call for assist;exit alarm on  -SC       User Key  (r) = Recorded By, (t) = Taken By, (c) = Cosigned By    Initials Name Provider Type    SC Mony Proctor, PT Physical Therapist    KRZYSZTOF Guillen, RN Registered Nurse          Physical Therapy Education     Title: PT OT SLP Therapies (Active)     Topic: Physical Therapy (Active)     Point: Mobility training (Active)    Learning Progress Summary    Learner Readiness Method Response Comment Documented by Status   Patient Acceptance E NL reviewed safety with mobility SC 03/06/18 1143 Active               Point: Home exercise program (Active)    Learning Progress Summary    Learner Readiness Method Response Comment Documented by Status   Patient Acceptance E NL reviewed safety with mobility SC 03/06/18 1143 Active               Point: Body mechanics (Active)    Learning Progress Summary    Learner Readiness Method Response Comment Documented by Status   Patient Acceptance E NL reviewed safety with mobility SC 03/06/18 1143 Active               Point: Precautions (Active)    Learning Progress Summary    Learner Readiness Method Response Comment Documented by Status   Patient Acceptance E NL reviewed safety with mobility SC 03/06/18 1143 Active                      User Key     Initials Effective Dates Name Provider Type Discipline    SC 06/19/15  -  Mony Proctor, PT Physical Therapist PT                PT Recommendation and Plan  Anticipated Discharge Disposition: home with assist  Planned Therapy Interventions: bed mobility training, home exercise program, gait training, strengthening, transfer training  PT Frequency: daily  Plan of Care Review  Plan Of Care Reviewed With: patient  Progress: progress toward functional goals is gradual  Outcome Summary/Follow up Plan: Patient able to participate with PT by mobilizing with walker. She reqires assist with all mobility for safety 2 to weakness and cognitive status. She will benefit from continued skilled PT          IP PT Goals       03/06/18 1144          Bed Mobility PT LTG    Bed Mobility PT LTG, Date Established 03/06/18  -SC      Bed Mobility PT LTG, Time to Achieve 5 - 7 days  -SC      Bed Mobility PT LTG, Activity Type supine to sit/sit to supine  -SC      Bed Mobility PT LTG, [place] Level conditional independence  -SC      Bed Mobility PT Goal  LTG, Assist Device bed rails  -SC      Bed Mobility PT LTG, Outcome goal ongoing  -SC      Transfer Training PT LTG    Transfer Training PT LTG, Date Established 03/06/18  -SC      Transfer Training PT LTG, Time to Achieve 5 - 7 days  -SC      Transfer Training PT LTG, Activity Type sit to stand/stand to sit;bed to chair /chair to bed  -SC      Transfer Training PT LTG, [place] Level contact guard assist  -SC      Transfer Training PT LTG, Assist Device walker, rolling  -SC      Transfer Training PT LTG, Outcome goal ongoing  -SC      Gait Training PT LTG    Gait Training Goal PT LTG, Date Established 03/06/18  -SC      Gait Training Goal PT LTG, Time to Achieve 5 - 7 days  -SC      Gait Training Goal PT LTG, [place] Level contact guard assist  -SC      Gait Training Goal PT LTG, Assist Device walker, rolling  -SC      Gait Training Goal PT LTG, Distance to Achieve 150  -SC      Gait Training Goal PT LTG, Outcome goal ongoing  -SC        User  Key  (r) = Recorded By, (t) = Taken By, (c) = Cosigned By    Initials Name Provider Type    SC Mony Proctor, PT Physical Therapist                Outcome Measures       03/06/18 0845          How much help from another person do you currently need...    Turning from your back to your side while in flat bed without using bedrails? 2  -SC      Moving from lying on back to sitting on the side of a flat bed without bedrails? 2  -SC      Moving to and from a bed to a chair (including a wheelchair)? 2  -SC      Standing up from a chair using your arms (e.g., wheelchair, bedside chair)? 3  -SC      Climbing 3-5 steps with a railing? 1  -SC      To walk in hospital room? 2  -SC      AM-PAC 6 Clicks Score 12  -SC      Functional Assessment    Outcome Measure Options AM-PAC 6 Clicks Basic Mobility (PT)  -SC        User Key  (r) = Recorded By, (t) = Taken By, (c) = Cosigned By    Initials Name Provider Type    SC Mony Proctor, PT Physical Therapist           Time Calculation:         PT Charges       03/06/18 1147          Time Calculation    Start Time 0845  -SC      PT Received On 03/06/18  -SC      PT Goal Re-Cert Due Date 03/16/18  -SC        User Key  (r) = Recorded By, (t) = Taken By, (c) = Cosigned By    Initials Name Provider Type    SC Mony Proctor, PT Physical Therapist          Therapy Charges for Today     Code Description Service Date Service Provider Modifiers Qty    78181168377 HC PT MOBILITY CURRENT 3/6/2018 Shearon A Hitesh, PT GP, CL 1    21015923267 HC PT MOBILITY PROJECTED 3/6/2018 Shearon A Hitesh, PT GP, CK 1    30396030611 HC PT EVAL LOW COMPLEXITY 4 3/6/2018 Shearon A Hitesh, PT GP 1          PT G-Codes  Outcome Measure Options: AM-PAC 6 Clicks Basic Mobility (PT)  Score: 12  Functional Limitation: Mobility: Walking and moving around  Mobility: Walking and Moving Around Current Status (): At least 60 percent but less than 80 percent impaired, limited or restricted  Mobility: Walking and Moving  Around Goal Status (): At least 40 percent but less than 60 percent impaired, limited or restricted      Mony Proctor, PT  3/6/2018

## 2018-03-06 NOTE — PLAN OF CARE
Problem: Patient Care Overview (Adult)  Goal: Plan of Care Review  Outcome: Ongoing (interventions implemented as appropriate)   03/06/18 0807   Coping/Psychosocial Response Interventions   Plan Of Care Reviewed With patient   Patient Care Overview   Progress no change   Outcome Evaluation   Outcome Summary/Follow up Plan Patient presents with left ischial chronic stage 2 PI (1.2x1.0x0.2cm) and bilateral gluteal friction areas. On waffle mattress. Dry flow pads in place, barrier cream applied, and sacral foam dressing in place. Skin and pressure interventions in place per RN. No other identifiable issues at this time. See skin care orders for care needs. Apply barrier cream to bottom BID. Venelex ointment to PI BID per Med order. Please contact WOC nurse if needs arise. Thanks

## 2018-03-06 NOTE — CONSULTS
Adult Nutrition  Assessment/PES    Patient Name:  Ana Cleary  YOB: 1926  MRN: [id number]  Admit Date:  3/5/2018    Assessment Date:  3/6/2018        Reason for Assessment       03/06/18 1206    Reason for Assessment    Reason For Assessment/Visit identified at risk by screening criteria    Identified At Risk By Screening Criteria MST SCORE 2+    Time Spent (min) 20    Diagnosis Diagnosis    Cardiac CAD;HTN    Gastrointestinal Constipation   ? abdominal pain; chronic constipation    Metabolic/ICU Hypokalemia    Neurological Dementia    Skin Other (comment)   L ischial chronic stage 2 PI, B/L gluteal friction areas              Nutrition/Diet History       03/06/18 1214    Nutrition/Diet History    Reported/Observed By Patient    Other Patient pleasantly confused. No family in room. Attempted to call patients family to question weight/intake history but received voicemail x2.            Anthropometrics       03/06/18 1216    Anthropometrics (Special Considerations)    Height Used for Calculations 1.524 m (5')    Weight Used for Calculations 56.2 kg (123 lb 14.4 oz)   standing scale weight per charting 3/6            Labs/Tests/Procedures/Meds       03/06/18 1216    Labs/Tests/Procedures/Meds    Labs/Tests Review Reviewed                Nutrition Prescription Ordered       03/06/18 1216    Nutrition Prescription PO    Current PO Diet Regular    Common Modifiers Cardiac            Evaluation of Received Nutrient/Fluid Intake       03/06/18 1216    PO Evaluation    Number of Days PO Intake Evaluated --   PO intake recorded from (3/6)    Number of Meals 1    % PO Intake 50%          Problem/Interventions:        Problem 1       03/06/18 1219    Nutrition Diagnoses Problem 1    Problem 1 No Nutrition Diagnosis at this Time                  Intervention Goal       03/06/18 1219    Intervention Goal    General Nutrition support treatment            Nutrition Intervention       03/06/18 1219    Nutrition  Intervention    RD/Tech Action Follow Tx progress;Care plan reviewd   clarify patient weight/intake history when family available              Education/Evaluation       03/06/18 1219    Monitor/Evaluation    Monitor Per protocol;PO intake        Electronically signed by:  Lupe Ghotra  03/06/18 12:19 PM

## 2018-03-06 NOTE — PROGRESS NOTES
H&P by my partner, Dr. Kerns, performed earlier on today's date reviewed.  Interim findings, labs and charting also reviewed. Pt was here last week with same issue, has chronic constipation which is a known issue! CT unremarkable.  Give mag citrate x1 and suppository, start daily bowel regimen and continue it at d/c!    Spoke with son at bedside.  Very helpful and he voiced concern that the SNF continues to send his mother to hospital despite his preference that they try more management of her constipation issues on site before ED evaluation since hospitalizations worsen her dementia.  CM to stop by and discuss with family about setting up a transfer agreement with the SNF to avoid future unneccessary ED visit (and subsequent admits since the SNF won't take the patient back in middle of night from ED).    Plan is d/c back to SNF in am once produce BM's and tolerates PO here. CM aware.         Electronically signed by Evelyn Ruiz MD, 03/06/18, 6:00 PM.

## 2018-03-07 PROBLEM — R10.9 ABDOMINAL PAIN: Status: RESOLVED | Noted: 2018-03-06 | Resolved: 2018-03-07

## 2018-03-07 PROBLEM — E87.20 LACTIC ACIDOSIS: Status: RESOLVED | Noted: 2018-03-06 | Resolved: 2018-03-07

## 2018-03-07 PROBLEM — E87.6 HYPOKALEMIA: Status: RESOLVED | Noted: 2018-03-06 | Resolved: 2018-03-07

## 2018-03-07 PROBLEM — K59.00 CONSTIPATION: Status: RESOLVED | Noted: 2018-03-06 | Resolved: 2018-03-07

## 2018-03-07 PROBLEM — K59.09 CHRONIC CONSTIPATION: Status: ACTIVE | Noted: 2018-03-06

## 2018-03-07 LAB
ANION GAP SERPL CALCULATED.3IONS-SCNC: 1 MMOL/L (ref 3–11)
BUN BLD-MCNC: 13 MG/DL (ref 9–23)
BUN/CREAT SERPL: 14.4 (ref 7–25)
CA-I SERPL ISE-MCNC: 1.28 MMOL/L (ref 1.12–1.32)
CALCIUM SPEC-SCNC: 8.9 MG/DL (ref 8.7–10.4)
CHLORIDE SERPL-SCNC: 115 MMOL/L (ref 99–109)
CO2 SERPL-SCNC: 27 MMOL/L (ref 20–31)
CREAT BLD-MCNC: 0.9 MG/DL (ref 0.6–1.3)
GFR SERPL CREATININE-BSD FRML MDRD: 59 ML/MIN/1.73
GLUCOSE BLD-MCNC: 78 MG/DL (ref 70–100)
MAGNESIUM SERPL-MCNC: 2.4 MG/DL (ref 1.3–2.7)
POTASSIUM BLD-SCNC: 5.9 MMOL/L (ref 3.5–5.5)
SODIUM BLD-SCNC: 143 MMOL/L (ref 132–146)

## 2018-03-07 PROCEDURE — G0378 HOSPITAL OBSERVATION PER HR: HCPCS

## 2018-03-07 PROCEDURE — 80048 BASIC METABOLIC PNL TOTAL CA: CPT | Performed by: FAMILY MEDICINE

## 2018-03-07 PROCEDURE — 97110 THERAPEUTIC EXERCISES: CPT

## 2018-03-07 PROCEDURE — 96372 THER/PROPH/DIAG INJ SC/IM: CPT

## 2018-03-07 PROCEDURE — 94640 AIRWAY INHALATION TREATMENT: CPT

## 2018-03-07 PROCEDURE — 83735 ASSAY OF MAGNESIUM: CPT | Performed by: FAMILY MEDICINE

## 2018-03-07 PROCEDURE — 94799 UNLISTED PULMONARY SVC/PX: CPT

## 2018-03-07 PROCEDURE — 82330 ASSAY OF CALCIUM: CPT | Performed by: FAMILY MEDICINE

## 2018-03-07 PROCEDURE — 99224 PR SBSQ OBSERVATION CARE/DAY 15 MINUTES: CPT | Performed by: PHYSICIAN ASSISTANT

## 2018-03-07 PROCEDURE — 25010000002 HEPARIN (PORCINE) PER 1000 UNITS: Performed by: NURSE PRACTITIONER

## 2018-03-07 PROCEDURE — 94760 N-INVAS EAR/PLS OXIMETRY 1: CPT

## 2018-03-07 PROCEDURE — 97116 GAIT TRAINING THERAPY: CPT

## 2018-03-07 RX ORDER — DOCUSATE SODIUM 100 MG/1
100 CAPSULE, LIQUID FILLED ORAL DAILY
Qty: 15 CAPSULE | Refills: 0 | Status: SHIPPED | OUTPATIENT
Start: 2018-03-07 | End: 2018-03-09 | Stop reason: HOSPADM

## 2018-03-07 RX ORDER — ONDANSETRON 4 MG/1
4 TABLET, FILM COATED ORAL EVERY 6 HOURS PRN
Qty: 10 TABLET | Refills: 0 | Status: SHIPPED | OUTPATIENT
Start: 2018-03-07

## 2018-03-07 RX ORDER — AMLODIPINE BESYLATE 5 MG/1
5 TABLET ORAL DAILY
Qty: 5 TABLET | Refills: 0 | Status: SHIPPED | OUTPATIENT
Start: 2018-03-08

## 2018-03-07 RX ORDER — MAGNESIUM SULFATE 1 G/100ML
1 INJECTION INTRAVENOUS AS NEEDED
Status: DISCONTINUED | OUTPATIENT
Start: 2018-03-07 | End: 2018-03-09 | Stop reason: HOSPADM

## 2018-03-07 RX ORDER — MAGNESIUM SULFATE HEPTAHYDRATE 40 MG/ML
2 INJECTION, SOLUTION INTRAVENOUS AS NEEDED
Status: DISCONTINUED | OUTPATIENT
Start: 2018-03-07 | End: 2018-03-09 | Stop reason: HOSPADM

## 2018-03-07 RX ORDER — CASTOR OIL AND BALSAM, PERU 788; 87 MG/G; MG/G
OINTMENT TOPICAL
Qty: 30 G | Refills: 0 | Status: SHIPPED | OUTPATIENT
Start: 2018-03-07

## 2018-03-07 RX ADMIN — SERTRALINE HYDROCHLORIDE 50 MG: 50 TABLET ORAL at 09:53

## 2018-03-07 RX ADMIN — POTASSIUM CHLORIDE 40 MEQ: 750 CAPSULE, EXTENDED RELEASE ORAL at 01:49

## 2018-03-07 RX ADMIN — CASTOR OIL AND BALSAM, PERU: 788; 87 OINTMENT TOPICAL at 21:05

## 2018-03-07 RX ADMIN — MICONAZOLE NITRATE 1 APPLICATION: 2 CREAM TOPICAL at 09:54

## 2018-03-07 RX ADMIN — CARBIDOPA AND LEVODOPA 1 TABLET: 25; 100 TABLET ORAL at 09:53

## 2018-03-07 RX ADMIN — CARBIDOPA AND LEVODOPA 1 TABLET: 25; 100 TABLET ORAL at 12:24

## 2018-03-07 RX ADMIN — Medication 2 TABLET: at 21:00

## 2018-03-07 RX ADMIN — IPRATROPIUM BROMIDE 0.5 MG: 0.5 SOLUTION RESPIRATORY (INHALATION) at 19:28

## 2018-03-07 RX ADMIN — HEPARIN SODIUM 5000 UNITS: 5000 INJECTION, SOLUTION INTRAVENOUS; SUBCUTANEOUS at 21:00

## 2018-03-07 RX ADMIN — BUDESONIDE AND FORMOTEROL FUMARATE DIHYDRATE 2 PUFF: 160; 4.5 AEROSOL RESPIRATORY (INHALATION) at 19:28

## 2018-03-07 RX ADMIN — AMLODIPINE BESYLATE 5 MG: 5 TABLET ORAL at 09:53

## 2018-03-07 RX ADMIN — LISINOPRIL 10 MG: 10 TABLET ORAL at 09:54

## 2018-03-07 RX ADMIN — DONEPEZIL HYDROCHLORIDE 10 MG: 10 TABLET, FILM COATED ORAL at 21:00

## 2018-03-07 RX ADMIN — FUROSEMIDE 20 MG: 20 TABLET ORAL at 09:54

## 2018-03-07 RX ADMIN — CARBIDOPA AND LEVODOPA 1 TABLET: 25; 100 TABLET ORAL at 17:29

## 2018-03-07 RX ADMIN — BUDESONIDE AND FORMOTEROL FUMARATE DIHYDRATE 2 PUFF: 160; 4.5 AEROSOL RESPIRATORY (INHALATION) at 07:56

## 2018-03-07 RX ADMIN — MULTIPLE VITAMINS W/ MINERALS TAB 1 TABLET: TAB ORAL at 09:53

## 2018-03-07 RX ADMIN — CASTOR OIL AND BALSAM, PERU: 788; 87 OINTMENT TOPICAL at 09:54

## 2018-03-07 RX ADMIN — MICONAZOLE NITRATE 1 APPLICATION: 2 CREAM TOPICAL at 21:01

## 2018-03-07 RX ADMIN — ASPIRIN 81 MG: 81 TABLET, COATED ORAL at 09:53

## 2018-03-07 RX ADMIN — IPRATROPIUM BROMIDE 0.5 MG: 0.5 SOLUTION RESPIRATORY (INHALATION) at 07:56

## 2018-03-07 RX ADMIN — HEPARIN SODIUM 5000 UNITS: 5000 INJECTION, SOLUTION INTRAVENOUS; SUBCUTANEOUS at 06:50

## 2018-03-07 RX ADMIN — OXYCODONE HYDROCHLORIDE AND ACETAMINOPHEN 1000 MG: 500 TABLET ORAL at 09:53

## 2018-03-07 NOTE — PLAN OF CARE
Problem: Patient Care Overview (Adult)  Goal: Plan of Care Review  Outcome: Ongoing (interventions implemented as appropriate)   03/06/18 1620 03/06/18 1800   Coping/Psychosocial Response Interventions   Plan Of Care Reviewed With --  patient   Patient Care Overview   Progress improving --    Outcome Evaluation   Outcome Summary/Follow up Plan pt up to chair today and taco well, pt on 2L NC taco well keeping sats above 95%, pt confused oriented to person only  --      Goal: Discharge Needs Assessment  Outcome: Ongoing (interventions implemented as appropriate)      Problem: Skin Integrity Impairment, Risk/Actual (Adult)  Goal: Identify Related Risk Factors and Signs and Symptoms  Outcome: Ongoing (interventions implemented as appropriate)    Goal: Skin Integrity/Wound Healing  Outcome: Ongoing (interventions implemented as appropriate)

## 2018-03-07 NOTE — DISCHARGE PLACEMENT REQUEST
"Cierra Medley RN  Case Management  P: [phone]    Discharge Summary attached      Vinny Jimenes (89 y.o. Female)     Date of Birth Social Security Number Address Home Phone MRN    07/03/1926  [address] [facility] [id number] [phone] [id number]    Rastafari Marital Status          None        Admission Date Admission Type Admitting Provider Attending Provider Department, Room/Bed    3/5/18 Emergency Beltran Youssef MD Opi[place], [place], MD [facility] 4G, S463/1    Discharge Date Discharge Disposition Discharge Destination         Skilled Nursing Facility (MD - External)             Attending Provider: Beltran Youssef MD     Allergies:  No Known Allergies    Isolation:  None   Infection:  None   Code Status:  FULL    Ht:  152.4 cm (60\")   Wt:  57 kg (125 lb 10.6 oz)    Admission Cmt:  None   Principal Problem:  Chronic constipation [K59.09]                 Active Insurance as of 3/5/2018     Primary Coverage     Payor Plan Insurance Group Employer/Plan Group    MEDICARE MEDICARE A & B      Payor Plan Address Payor Plan Phone Number Effective From Effective To    [address] [phone] 7/1/1991     [place] [address]       Subscriber Name Subscriber Birth Date Member ID       VINNY JIMENES 7/3/1926 [id number]           Secondary Coverage     Payor Plan Insurance Group Employer/Plan Group    [facility] SUPP [id number]     Payor Plan Address Payor Plan Phone Number Effective From Effective To    [address]  12/1/2016     [place] [address]       Subscriber Name Subscriber Birth Date Member ID       VINNY JIMENES 7/3/1926 [id number]                 Emergency Contacts      (Rel.) Home Phone Work Phone Mobile Phone    Venus Jimenes (Other) [phone] -- --               Discharge Summary      Lisa Perez PA-C at 3/7/2018 11:08 AM              [facility] Medicine Services  DISCHARGE SUMMARY    Patient Name: Vinny " Madiha  : 7/3/1926  MRN: [id number]    Date of Admission: 3/5/2018  Date of Discharge:  3/7/18  Primary Care Physician: Kasey Bethea MD    Hospital Course     Presenting Problem:   Abdominal pain, unspecified abdominal location [R10.9]    Active Hospital Problems (** Indicates Principal Problem)    Diagnosis Date Noted   • **Chronic constipation [K59.09] 2018   • Dementia [F03.90]    • Falls frequently [R29.6]    • Hypertension [I10]       Resolved Hospital Problems    Diagnosis Date Noted Date Resolved   • Abdominal pain [R10.9] 2018   • Lactic acidosis [E87.2] 2018   • Hypokalemia [E87.6] 2018      Hospital Course:  Ms. Ana Cleary is a 89yo female female with PMH significant for hypertension, prior CVA dementia and chronic constipation.  She was brought to [facility] ED on the morning of 3/6/18 for evaluation of abdominal pain and possible constipation.  Due to dementia, the patient was unable to provide history and was uncertain why or how she had gotten to the hospital.  She did report that she had trouble going to the bathroom and denied abdominal pain at the time of exam.    ED evaluation revealed hypokalemia and mildly elevated lactate.  She was treated with IV fluids, potassium replacement and bowel regimen.  At discharge, we'll continue increased bowel regimen.  The patient's son did voice concerns of the patient has been brought to the ED unnecessarily on multiple occasions due to her chronic constipation.    Patient also noted to have a stage II pressure injury to her L buttock. Recommend application of Velelex ointment BID.     Ms. Cleary will return to [facility] in stable condition on 3/7/18.     Day of Discharge     HPI:   Resting in bed, she reports that she feels well. No chest pain or shortness of breath. She denies abdominal pain. Bowels are moving.    Review of Systems  Gen- No fevers, chills  CV- No chest  pain, palpitations  Resp- No cough, dyspnea  GI- No N/V/D, abd pain    Otherwise ROS is negative except as mentioned in the HPI.    Vital Signs:   Temp:  [97.7 °F (36.5 °C)-98.9 °F (37.2 °C)] 98.3 °F (36.8 °C)  Heart Rate:  [62-80] 69  Resp:  [16-20] 18  BP: (115-168)/(52-72) 127/67     Physical Exam:  Constitutional: No acute distress, awake, alert  HENT: NCAT, mucous membranes moist  Respiratory: Clear to auscultation bilaterally, respiratory effort normal   Cardiovascular: RRR, no murmurs, rubs, or gallops, palpable pedal pulses bilaterally  Gastrointestinal: Positive bowel sounds, soft, nontender, nondistended  Musculoskeletal: No bilateral ankle edema  Psychiatric: Appropriate affect, cooperative  Neurologic: Oriented x to self only, strength symmetric in all extremities, Cranial Nerves grossly intact to confrontation, speech clear  Skin: No rashes    Pertinent  and/or Most Recent Results       Results from last 7 days  Lab Units 03/07/18  0437 03/06/18  0556 03/06/18 0100 02/28/18  2159   WBC 10*3/mm3  --  11.64* 8.87 10.24   HEMOGLOBIN g/dL  --  12.5 13.4 13.2   HEMATOCRIT %  --  40.7 42.5 42.2   PLATELETS 10*3/mm3  --  251 287 293   SODIUM mmol/L 143 142 135 135   POTASSIUM mmol/L 5.9* 3.1* 3.4* 3.4*   CHLORIDE mmol/L 115* 104 98* 98*   CO2 mmol/L 27.0 26.0 22.0 20.0   BUN mg/dL 13 14 16 22   CREATININE mg/dL 0.90 1.00 1.10 1.70*   GLUCOSE mg/dL 78 80 91 91   CALCIUM mg/dL 8.9 8.8 9.6 9.7       Results from last 7 days  Lab Units 03/06/18  0556 03/06/18 0100 02/28/18  2159   BILIRUBIN mg/dL 1.0 0.9 0.7   ALK PHOS U/L 71 82 86   ALT (SGPT) U/L 5* 6* 5*   AST (SGOT) U/L 9 12 28       Results from last 7 days  Lab Units 02/28/18  2159   BNP pg/mL 129.0*     Brief Urine Lab Results  (Last result in the past 365 days)      Color   Clarity   Blood   Leuk Est   Nitrite   Protein   CREAT   Urine HCG        03/06/18 0116 Yellow Clear Negative Trace(A) Negative Negative             Microbiology Results Abnormal      None        Imaging Results (all)     Procedure Component Value Units Date/Time    XR Abdomen 2 View With Chest 1 View [[id number]] Collected:  03/06/18 0026     Updated:  03/06/18 0150    Narrative:       EXAM:    XR Abdomen 2 Views With XR Chest    CLINICAL HISTORY:    89 years old, female; Pain; Abdominal pain; Generalized; Patient HX: Patient   complains of entire abdominal pain and distention patient's family member   states that the patient was seen here at [facility] on 2/28/2018   for the same problem and it has not resolved. HX: Non-hospital hypertension   carotid artery disease dementia falls frequently hypotension due to drugs;   Additional info: Abd pain    TECHNIQUE:    Frontal view of the chest, frontal view of the abdomen/pelvis and upright or   decubitus view of the abdomen.    COMPARISON:    CR - XR CHEST 1 VW 2018-02-28 22:16    FINDINGS:    Lungs:  There is pulmonary granulomatous scarring.    Pleural space:  Unremarkable.  No pneumothorax.    Heart:  Unremarkable.  No cardiomegaly.    Mediastinum:  Unremarkable.    Intraperitoneal space:  No free air.    Gastrointestinal tract:  Unremarkable.  No dilation.    Bones/joints:  There is scoliosis with degenerative changes of the spine.      Impression:         No acute findings.    THIS DOCUMENT HAS BEEN ELECTRONICALLY SIGNED BY TYRESE LE MD    CT Angiogram Chest With Contrast [[id number]] Collected:  03/06/18 0259     Updated:  03/06/18 0431    Narrative:       EXAM:    CT Angiography Chest With Intravenous Contrast    CLINICAL HISTORY:    89 years old, female; Acidosis; Pressure ulcer of right buttock, stage 1;   Unspecified abdominal pain; Signs and symptoms; Shortness of breath; Additional   info: Chest pain, SOA    TECHNIQUE:    Axial computed tomographic angiography images of the chest with intravenous   contrast using pulmonary embolism protocol.  All CT scans at this facility use   one or more dose reduction techniques, viz.:  automated exposure control; ma/kV   adjustment per patient size (including targeted exams where dose is matched to   indication; i.e. head); or iterative reconstruction technique.    MIP reconstructed images were created and reviewed.    CONTRAST:    40 mL of isovue 370 administered intravenously.    COMPARISON:    CT CHEST W WO CONTRAST 2017-08-31 11:08    FINDINGS:    Pulmonary arteries:  No pulmonary embolism.    Aorta:  Moderate atherosclerotic changes.  No thoracic aortic aneurysm.    Lungs:  Calcified lingular granuloma.  Hazy areas of groundglass opacity in   the right midlung field, decreased.  Scarring medially in the right lung base,   unchanged.  Atelectasis medially in the left lower lobe with bronchial crowding   and areas of groundglass attenuation and linear density laterally, unchanged.    Mosaic attenuation in the left apex suggestive of air trapping, unchanged.  No   appreciable mass or acute air space disease.    Pleural space:  No significant effusion.  No pneumothorax.    Heart:  Coronary calcifications.  No significant pericardial effusion.    Mediastinum: Small hiatal hernia.    Bones/joints:  No acute fracture.  No dislocation.    Soft tissues:  Unremarkable.    Lymph nodes:  Calcified prevascular/AP window lymph nodes, unchanged.      Impression:         No definite acute findings.  Bibasilar scarring with partial atelectasis in   the left lower lobe, unchanged.  Decreased areas of groundglass attenuation in   the right midlung field may represent postinflammatory change.    THIS DOCUMENT HAS BEEN ELECTRONICALLY SIGNED BY CRISTINA HUTTON MD    CT Abdomen Pelvis With Contrast [[id number]] Collected:  03/06/18 0258     Updated:  03/06/18 0444    Narrative:       EXAM:    CT Abdomen and Pelvis With Intravenous Contrast    CLINICAL HISTORY:    89 years old, female; Acidosis; Pressure ulcer of right buttock, stage 1;   Unspecified abdominal pain; Other: Pelvic; Additional info: Abdominal pain,    unspecified    TECHNIQUE:    Axial computed tomography images of the abdomen and pelvis with intravenous   contrast.  All CT scans at this facility use one or more dose reduction   techniques, viz.: automated exposure control; ma/kV adjustment per patient size   (including targeted exams where dose is matched to indication; i.e. head); or   iterative reconstruction technique.    Coronal reformatted images were created and reviewed.    CONTRAST:    70 mL of isovue 370 administered intravenously.    COMPARISON:    CT ABDOMEN PELVIS WO CONTRAST 2018-02-28 23:14    FINDINGS:    Lower thorax:  Bibasilar scarring with partial atelectasis in the left lower   lobe, unchanged.     ABDOMEN:    Liver:  Normal.    Gallbladder and bile ducts:  Unremarkable.    Pancreas:  Normal.    Spleen:  Normal.    Adrenals:  Normal.    Kidneys and ureters:  Normal.    Stomach and bowel:  Sigmoid diverticulosis.  Otherwise unremarkable.  No   focal inflammatory changes or evidence of obstruction.    Appendix:  No findings to suggest acute appendicitis.     PELVIS:    Bladder:  Distended.  Unremarkable.    Reproductive:  Hysterectomy.     ABDOMEN and PELVIS:    Intraperitoneal space:  No free air.  No significant fluid collection.    Bones/joints: No acute findings.    Soft tissues:  Apparent perianal/perineal soft tissue thickening may be due   to crowding of structures, similar in appearance to previous study.  Bandlike   strandy density in the gluteal subcutaneous soft tissues extending toward the   inferior aspect of the perianal region on the right, unchanged.  No organized   fluid collection.  Short segment small bowel in a right inguinal hernia without   evidence of obstruction, slightly more prominent.    Vasculature:  Dense atherosclerotic calcifications.    Lymph nodes:  Unremarkable.  No enlarged lymph nodes.      Impression:         No acute findings.    THIS DOCUMENT HAS BEEN ELECTRONICALLY SIGNED BY CRISTINA HUTTON MD         Discharge Details      Ana Cleary   Home Medication Instructions UMA:[id number]    Printed on:03/07/18 4887   Medication Information                      acetaminophen (TYLENOL) 500 MG tablet  Take 500 mg by mouth Every 4 (Four) Hours As Needed for Fever.             amLODIPine (NORVASC) 5 MG tablet  Take 1 tablet by mouth Daily.             Ascorbic Acid (VITAMIN C PO)  Take 1,000 mg by mouth Daily.             aspirin 81 MG EC tablet  Take 81 mg by mouth Daily.             budesonide-formoterol (SYMBICORT) 160-4.5 MCG/ACT inhaler  Inhale 2 puffs 2 (Two) Times a Day.             carbidopa-levodopa (SINEMET)  MG per tablet  Take 2 tablets by mouth Daily. 2 tablets daily at 1700             carbidopa-levodopa (SINEMET)  MG per tablet  Take 1 tablet by mouth 2 (Two) Times a Day. 1 tablet daily at 0800 and 1200 (And 2 tabs at 1700)             castor oil-balsam peru (VENELEX) ointment  Apply to left gluteal pressure injury twice daily             Cranberry 450 MG capsule  Take 450 mg by mouth Daily.             docusate sodium (COLACE) 100 MG capsule  Take 1 capsule by mouth Daily.             donepezil (ARICEPT) 10 MG tablet  Take 10 mg by mouth Every Night.             furosemide (LASIX) 20 MG tablet  Take 1 tablet by mouth Daily.             ipratropium (ATROVENT) 0.02 % nebulizer solution  Take 500 mcg by nebulization 3 (Three) Times a Day.             lisinopril (PRINIVIL,ZESTRIL) 10 MG tablet  @@ TAKE ONE TABLET BY MOUTH DAILY             magnesium hydroxide (MILK OF MAGNESIA) 400 MG/5ML suspension  Take 5-10 mL by mouth Daily As Needed for Constipation.             ondansetron (ZOFRAN) 4 MG tablet  Take 1 tablet by mouth Every 6 (Six) Hours As Needed for Nausea or Vomiting.             polyethylene glycol (MIRALAX) pack packet  Take 17 g by mouth Daily.             potassium chloride (K-DUR) 10 MEQ CR tablet  Take 10 mEq by mouth Every Other Day.             sertraline (ZOLOFT) 50 MG  tablet  Take 50 mg by mouth Daily.               Discharge Disposition:  Skilled Nursing Facility (DC - External)    Discharge Diet:  Diet Instructions     Diet: Regular; Thin       Discharge Diet:  Regular   Fluid Consistency:  Thin               Discharge Activity:   Activity Instructions     Activity as Tolerated                   No future appointments.    Additional Instructions for the Follow-ups that You Need to Schedule     Discharge Follow-up with PCP    As directed    Follow Up Details:  Follow up with facility provider in 2-3 days                   Time Spent on Discharge:  35 minutes    Electronically signed by Lisa Perez PA-C, 03/07/18, 11:17 AM.         Electronically signed by Lisa Perez PA-C at 3/7/2018 11:24 AM

## 2018-03-07 NOTE — PLAN OF CARE
Problem: Patient Care Overview (Adult)  Goal: Plan of Care Review  Outcome: Ongoing (interventions implemented as appropriate)   03/07/18 1041   Coping/Psychosocial Response Interventions   Plan Of Care Reviewed With patient   Patient Care Overview   Progress progress towards functional goals is fair   Outcome Evaluation   Outcome Summary/Follow up Plan pt incontinent with stool,applied depends after bedside commode.needs mod. assist for transfer and gait.ambulat 30 ft only,more sob       Problem: Inpatient Physical Therapy  Goal: Bed Mobility Goal LTG- PT  Outcome: Ongoing (interventions implemented as appropriate)   03/06/18 1144 03/07/18 1041   Bed Mobility PT LTG   Bed Mobility PT LTG, Date Established 03/06/18 --    Bed Mobility PT LTG, Time to Achieve 5 - 7 days --    Bed Mobility PT LTG, Activity Type supine to sit/sit to supine --    Bed Mobility PT LTG, [place] Level conditional independence --    Bed Mobility PT Goal LTG, Assist Device bed rails --    Bed Mobility PT LTG, Outcome --  goal ongoing     Goal: Transfer Training Goal 1 LTG- PT  Outcome: Ongoing (interventions implemented as appropriate)   03/06/18 1144 03/07/18 1041   Transfer Training PT LTG   Transfer Training PT LTG, Date Established 03/06/18 --    Transfer Training PT LTG, Time to Achieve 5 - 7 days --    Transfer Training PT LTG, Activity Type sit to stand/stand to sit;bed to chair /chair to bed --    Transfer Training PT LTG, [place] Level contact guard assist --    Transfer Training PT LTG, Assist Device walker, rolling --    Transfer Training PT LTG, Outcome --  goal ongoing     Goal: Gait Training Goal LTG- PT  Outcome: Ongoing (interventions implemented as appropriate)   03/06/18 1144 03/07/18 1041   Gait Training PT LTG   Gait Training Goal PT LTG, Date Established 03/06/18 --    Gait Training Goal PT LTG, Time to Achieve 5 - 7 days --    Gait Training Goal PT LTG, [place] Level contact guard assist --    Gait Training  Goal PT LTG, Assist Device walker, rolling --    Gait Training Goal PT LTG, Distance to Achieve 150 --    Gait Training Goal PT LTG, Outcome --  goal ongoing

## 2018-03-07 NOTE — PROGRESS NOTES
Continued Stay Note  [facility]     Patient Name: Ana Cleary  MRN: [id number]  Today's Date: 3/7/2018    Admit Date: 3/5/2018          Discharge Plan       03/07/18 1101    Case Management/Social Work Plan    Plan Transfer back to [facility] today    Additional Comments Met with patient and son at bedside. Plan continues to be to return to [facility] today. Patient is currently wearing 2 LPM O2 and son reports she wears oxygen at baseline as well, but they are unsure what company it is through. Discussed with [employer] (P: [phone]) and they will provide portable oxygen tank to room today for patient to borrow for the ride to [facility].     Nurse to call report to P: [phone]. CM faxed discharge summary to F: [phone]. Please note that ANY NEW prescriptions will need to be sent on paper in order for the facility to get them filled appropriately. Thank you. Cierra Medley RN [phone]              Discharge Codes     None        Expected Discharge Date and Time     Expected Discharge Date Expected Discharge Time    Mar 7, 2018             Cierra Medley RN

## 2018-03-07 NOTE — DISCHARGE SUMMARY
[facility]  DISCHARGE SUMMARY    Patient Name: Ana Cleary  : 7/3/1926  MRN: [id number]    Date of Admission: 3/5/2018  Date of Discharge:  3/7/18  Primary Care Physician: Kasey Bethea MD    Hospital Course     Presenting Problem:   Abdominal pain, unspecified abdominal location [R10.9]    Active Hospital Problems (** Indicates Principal Problem)    Diagnosis Date Noted   • **Chronic constipation [K59.09] 2018   • Dementia [F03.90]    • Falls frequently [R29.6]    • Hypertension [I10]       Resolved Hospital Problems    Diagnosis Date Noted Date Resolved   • Abdominal pain [R10.9] 2018   • Lactic acidosis [E87.2] 2018   • Hypokalemia [E87.6] 2018      Hospital Course:  Ms. Ana Cleary is a 89yo female female with PMH significant for hypertension, prior CVA dementia and chronic constipation.  She was brought to [facility] ED on the morning of 3/6/18 for evaluation of abdominal pain and possible constipation.  Due to dementia, the patient was unable to provide history and was uncertain why or how she had gotten to the hospital.  She did report that she had trouble going to the bathroom and denied abdominal pain at the time of exam.    ED evaluation revealed hypokalemia and mildly elevated lactate.  She was treated with IV fluids, potassium replacement and bowel regimen.  At discharge, we'll continue increased bowel regimen.  The patient's son did voice concerns of the patient has been brought to the ED unnecessarily on multiple occasions due to her chronic constipation.    Patient also noted to have a stage II pressure injury to her L buttock. Recommend application of Velelex ointment BID.     Ms. Cleary will return to [facility] in stable condition on 3/7/18.     Day of Discharge     HPI:   Resting in bed, she reports that she feels well. No chest pain or shortness of breath. She  denies abdominal pain. Bowels are moving.    Review of Systems  Gen- No fevers, chills  CV- No chest pain, palpitations  Resp- No cough, dyspnea  GI- No N/V/D, abd pain    Otherwise ROS is negative except as mentioned in the HPI.    Vital Signs:   Temp:  [97.7 °F (36.5 °C)-98.9 °F (37.2 °C)] 98.3 °F (36.8 °C)  Heart Rate:  [62-80] 69  Resp:  [16-20] 18  BP: (115-168)/(52-72) 127/67     Physical Exam:  Constitutional: No acute distress, awake, alert  HENT: NCAT, mucous membranes moist  Respiratory: Clear to auscultation bilaterally, respiratory effort normal   Cardiovascular: RRR, no murmurs, rubs, or gallops, palpable pedal pulses bilaterally  Gastrointestinal: Positive bowel sounds, soft, nontender, nondistended  Musculoskeletal: No bilateral ankle edema  Psychiatric: Appropriate affect, cooperative  Neurologic: Oriented x to self only, strength symmetric in all extremities, Cranial Nerves grossly intact to confrontation, speech clear  Skin: No rashes    Pertinent  and/or Most Recent Results       Results from last 7 days  Lab Units 03/07/18  0437 03/06/18  0556 03/06/18  0100 02/28/18  2159   WBC 10*3/mm3  --  11.64* 8.87 10.24   HEMOGLOBIN g/dL  --  12.5 13.4 13.2   HEMATOCRIT %  --  40.7 42.5 42.2   PLATELETS 10*3/mm3  --  251 287 293   SODIUM mmol/L 143 142 135 135   POTASSIUM mmol/L 5.9* 3.1* 3.4* 3.4*   CHLORIDE mmol/L 115* 104 98* 98*   CO2 mmol/L 27.0 26.0 22.0 20.0   BUN mg/dL 13 14 16 22   CREATININE mg/dL 0.90 1.00 1.10 1.70*   GLUCOSE mg/dL 78 80 91 91   CALCIUM mg/dL 8.9 8.8 9.6 9.7       Results from last 7 days  Lab Units 03/06/18  0556 03/06/18  0100 02/28/18  2159   BILIRUBIN mg/dL 1.0 0.9 0.7   ALK PHOS U/L 71 82 86   ALT (SGPT) U/L 5* 6* 5*   AST (SGOT) U/L 9 12 28       Results from last 7 days  Lab Units 02/28/18  2159   BNP pg/mL 129.0*     Brief Urine Lab Results  (Last result in the past 365 days)      Color   Clarity   Blood   Leuk Est   Nitrite   Protein   CREAT   Urine HCG        03/06/18  0116 Yellow Clear Negative Trace(A) Negative Negative             Microbiology Results Abnormal     None        Imaging Results (all)     Procedure Component Value Units Date/Time    XR Abdomen 2 View With Chest 1 View [[id number]] Collected:  03/06/18 0026     Updated:  03/06/18 0150    Narrative:       EXAM:    XR Abdomen 2 Views With XR Chest    CLINICAL HISTORY:    89 years old, female; Pain; Abdominal pain; Generalized; Patient HX: Patient   complains of entire abdominal pain and distention patient's family member   states that the patient was seen here at [facility] on 2/28/2018   for the same problem and it has not resolved. HX: Non-hospital hypertension   carotid artery disease dementia falls frequently hypotension due to drugs;   Additional info: Abd pain    TECHNIQUE:    Frontal view of the chest, frontal view of the abdomen/pelvis and upright or   decubitus view of the abdomen.    COMPARISON:    CR - XR CHEST 1 VW 2018-02-28 22:16    FINDINGS:    Lungs:  There is pulmonary granulomatous scarring.    Pleural space:  Unremarkable.  No pneumothorax.    Heart:  Unremarkable.  No cardiomegaly.    Mediastinum:  Unremarkable.    Intraperitoneal space:  No free air.    Gastrointestinal tract:  Unremarkable.  No dilation.    Bones/joints:  There is scoliosis with degenerative changes of the spine.      Impression:         No acute findings.    THIS DOCUMENT HAS BEEN ELECTRONICALLY SIGNED BY TYRESE LE MD    CT Angiogram Chest With Contrast [[id number]] Collected:  03/06/18 0259     Updated:  03/06/18 0431    Narrative:       EXAM:    CT Angiography Chest With Intravenous Contrast    CLINICAL HISTORY:    89 years old, female; Acidosis; Pressure ulcer of right buttock, stage 1;   Unspecified abdominal pain; Signs and symptoms; Shortness of breath; Additional   info: Chest pain, SOA    TECHNIQUE:    Axial computed tomographic angiography images of the chest with intravenous   contrast using pulmonary  embolism protocol.  All CT scans at this facility use   one or more dose reduction techniques, viz.: automated exposure control; ma/kV   adjustment per patient size (including targeted exams where dose is matched to   indication; i.e. head); or iterative reconstruction technique.    MIP reconstructed images were created and reviewed.    CONTRAST:    40 mL of isovue 370 administered intravenously.    COMPARISON:    CT CHEST W WO CONTRAST 2017-08-31 11:08    FINDINGS:    Pulmonary arteries:  No pulmonary embolism.    Aorta:  Moderate atherosclerotic changes.  No thoracic aortic aneurysm.    Lungs:  Calcified lingular granuloma.  Hazy areas of groundglass opacity in   the right midlung field, decreased.  Scarring medially in the right lung base,   unchanged.  Atelectasis medially in the left lower lobe with bronchial crowding   and areas of groundglass attenuation and linear density laterally, unchanged.    Mosaic attenuation in the left apex suggestive of air trapping, unchanged.  No   appreciable mass or acute air space disease.    Pleural space:  No significant effusion.  No pneumothorax.    Heart:  Coronary calcifications.  No significant pericardial effusion.    Mediastinum: Small hiatal hernia.    Bones/joints:  No acute fracture.  No dislocation.    Soft tissues:  Unremarkable.    Lymph nodes:  Calcified prevascular/AP window lymph nodes, unchanged.      Impression:         No definite acute findings.  Bibasilar scarring with partial atelectasis in   the left lower lobe, unchanged.  Decreased areas of groundglass attenuation in   the right midlung field may represent postinflammatory change.    THIS DOCUMENT HAS BEEN ELECTRONICALLY SIGNED BY CRISTINA HUTTON MD    CT Abdomen Pelvis With Contrast [[id number]] Collected:  03/06/18 0258     Updated:  03/06/18 0444    Narrative:       EXAM:    CT Abdomen and Pelvis With Intravenous Contrast    CLINICAL HISTORY:    89 years old, female; Acidosis; Pressure ulcer of right  buttock, stage 1;   Unspecified abdominal pain; Other: Pelvic; Additional info: Abdominal pain,   unspecified    TECHNIQUE:    Axial computed tomography images of the abdomen and pelvis with intravenous   contrast.  All CT scans at this facility use one or more dose reduction   techniques, viz.: automated exposure control; ma/kV adjustment per patient size   (including targeted exams where dose is matched to indication; i.e. head); or   iterative reconstruction technique.    Coronal reformatted images were created and reviewed.    CONTRAST:    70 mL of isovue 370 administered intravenously.    COMPARISON:    CT ABDOMEN PELVIS WO CONTRAST 2018-02-28 23:14    FINDINGS:    Lower thorax:  Bibasilar scarring with partial atelectasis in the left lower   lobe, unchanged.     ABDOMEN:    Liver:  Normal.    Gallbladder and bile ducts:  Unremarkable.    Pancreas:  Normal.    Spleen:  Normal.    Adrenals:  Normal.    Kidneys and ureters:  Normal.    Stomach and bowel:  Sigmoid diverticulosis.  Otherwise unremarkable.  No   focal inflammatory changes or evidence of obstruction.    Appendix:  No findings to suggest acute appendicitis.     PELVIS:    Bladder:  Distended.  Unremarkable.    Reproductive:  Hysterectomy.     ABDOMEN and PELVIS:    Intraperitoneal space:  No free air.  No significant fluid collection.    Bones/joints: No acute findings.    Soft tissues:  Apparent perianal/perineal soft tissue thickening may be due   to crowding of structures, similar in appearance to previous study.  Bandlike   strandy density in the gluteal subcutaneous soft tissues extending toward the   inferior aspect of the perianal region on the right, unchanged.  No organized   fluid collection.  Short segment small bowel in a right inguinal hernia without   evidence of obstruction, slightly more prominent.    Vasculature:  Dense atherosclerotic calcifications.    Lymph nodes:  Unremarkable.  No enlarged lymph nodes.      Impression:         No  acute findings.    THIS DOCUMENT HAS BEEN ELECTRONICALLY SIGNED BY CRISTINA HUTTON MD        Discharge Details      Ana Cleary   Home Medication Instructions UMA:[id number]    Printed on:03/07/18 1114   Medication Information                      acetaminophen (TYLENOL) 500 MG tablet  Take 500 mg by mouth Every 4 (Four) Hours As Needed for Fever.             amLODIPine (NORVASC) 5 MG tablet  Take 1 tablet by mouth Daily.             Ascorbic Acid (VITAMIN C PO)  Take 1,000 mg by mouth Daily.             aspirin 81 MG EC tablet  Take 81 mg by mouth Daily.             budesonide-formoterol (SYMBICORT) 160-4.5 MCG/ACT inhaler  Inhale 2 puffs 2 (Two) Times a Day.             carbidopa-levodopa (SINEMET)  MG per tablet  Take 2 tablets by mouth Daily. 2 tablets daily at 1700             carbidopa-levodopa (SINEMET)  MG per tablet  Take 1 tablet by mouth 2 (Two) Times a Day. 1 tablet daily at 0800 and 1200 (And 2 tabs at 1700)             castor oil-balsam peru (VENELEX) ointment  Apply to left gluteal pressure injury twice daily             Cranberry 450 MG capsule  Take 450 mg by mouth Daily.             docusate sodium (COLACE) 100 MG capsule  Take 1 capsule by mouth Daily.             donepezil (ARICEPT) 10 MG tablet  Take 10 mg by mouth Every Night.             furosemide (LASIX) 20 MG tablet  Take 1 tablet by mouth Daily.             ipratropium (ATROVENT) 0.02 % nebulizer solution  Take 500 mcg by nebulization 3 (Three) Times a Day.             lisinopril (PRINIVIL,ZESTRIL) 10 MG tablet  @@ TAKE ONE TABLET BY MOUTH DAILY             magnesium hydroxide (MILK OF MAGNESIA) 400 MG/5ML suspension  Take 5-10 mL by mouth Daily As Needed for Constipation.             ondansetron (ZOFRAN) 4 MG tablet  Take 1 tablet by mouth Every 6 (Six) Hours As Needed for Nausea or Vomiting.             polyethylene glycol (MIRALAX) pack packet  Take 17 g by mouth Daily.             potassium chloride (K-DUR) 10 MEQ CR  tablet  Take 10 mEq by mouth Every Other Day.             sertraline (ZOLOFT) 50 MG tablet  Take 50 mg by mouth Daily.               Discharge Disposition:  Skilled Nursing Facility (DC - External)    Discharge Diet:  Diet Instructions     Diet: Regular; Thin       Discharge Diet:  Regular   Fluid Consistency:  Thin               Discharge Activity:   Activity Instructions     Activity as Tolerated                   No future appointments.    Additional Instructions for the Follow-ups that You Need to Schedule     Discharge Follow-up with PCP    As directed    Follow Up Details:  Follow up with facility provider in 2-3 days                   Time Spent on Discharge:  35 minutes    Electronically signed by Lisa Perez PA-C, 03/07/18, 11:17 AM.

## 2018-03-07 NOTE — PROGRESS NOTES
Continued Stay Note  [facility]     Patient Name: Ana Cleary  MRN: [id number]  Today's Date: 3/7/2018    Admit Date: 3/5/2018          Discharge Plan       03/07/18 1447    Case Management/Social Work Plan    Plan Skilled rehab vs long-term placement    Patient/Family In Agreement With Plan yes    Additional Comments Spoke with Michelle Fuentes, director of nursing at [facility] (P: [phone]). She states they are unable to accept patient back to their facility today for two reasons: 1. Patient has a stage II pressure ulcer documented on her left ischium and they cannot do ANY type of dressing changes or wound care; 2. Patient's level of care has changed over the past 6 weeks and she is now requiring a level of care that they cannot provide. Discussed this with patient, son, and grandson. Patient is in observation status currently, and therefore would not qualify for skilled rehab to be paid for by Medicare. Family is willing to pay out of pocket for short-term rehab that may turn in to long-term care placement. They have a long term care insurance policy as well. They understand that nursing home placement can cost up to $6,000 per month.     Per their request, I have called referrals to [place] with [facility], Maria R with [facility], and Claudette with [facility]. Per Claudette, [facility] is at max capacity with no short or long-term beds available. Per [facility] may have a semi-private room available tomorrow and she will evaluate patient's information Thursday morning. Still waiting to hear back from [facility] regarding bed offer. Cierra Medley RN [phone]               Discharge Codes     None        Expected Discharge Date and Time     Expected Discharge Date Expected Discharge Time    Mar 7, 2018             Cierra Medley RN

## 2018-03-07 NOTE — THERAPY TREATMENT NOTE
Acute Care - Physical Therapy Treatment Note  [facility]     Patient Name: Ana Cleary  : 7/3/1926  MRN: [id number]  Today's Date: 3/7/2018  Onset of Illness/Injury or Date of Surgery Date: 18  Date of Referral to PT: 18  Referring Physician: Claudette BAPTISTE    Admit Date: 3/5/2018    Visit Dx:    ICD-10-CM ICD-9-CM   1. Abdominal pain, unspecified abdominal location R10.9 789.00   2. Lactic acidosis E87.2 276.2   3. Decubitus ulcer of right buttock, stage 1 L89.311 707.05     707.21   4. Impaired functional mobility, balance, gait, and endurance Z74.09 V49.89     Patient Active Problem List   Diagnosis   • Hypertension   • Carotid artery disease   • Dementia   • Falls frequently   • Hypotension due to drugs   • Abdominal pain   • Lactic acidosis   • Constipation   • Hypokalemia               Adult Rehabilitation Note       18 0940          Rehab Assessment/Intervention    Discipline physical therapy assistant  -UD      Document Type therapy note (daily note)  -UD      Subjective Information agree to therapy;no complaints  -UD      Patient Effort, Rehab Treatment good  -UD      Symptoms Noted During/After Treatment fatigue;shortness of breath  -UD      Precautions/Limitations fall precautions  -UD      Recorded by [UD] Lexis Craft PTA      Vital Signs    O2 Delivery Post Treatment supplemental O2  -UD      Pre Patient Position Supine  -UD      Intra Patient Position Standing  -UD      Post Patient Position Sitting  -UD      Recorded by [UD] Lexis Craft PTA      Pain Assessment    Pain Assessment No/denies pain  -UD      Recorded by [UD] Lexis Craft PTA      Cognitive Assessment/Intervention    Orientation Status oriented to;person  -UD      Follows Commands/Answers Questions 75% of the time  -UD      Personal Safety Interventions fall prevention program maintained  -UD      Recorded by [UD] Lexis Craft PTA      Bed Mobility, Assessment/Treatment    Bed Mob, Supine to Sit, [place]  moderate assist (50% patient effort);2 person assist required;verbal cues required  -UD      Recorded by [UD] Lexis Craft PTA      Transfer Assessment/Treatment    Transfers, Sit-Stand [place] moderate assist (50% patient effort);2 person assist required  -UD      Transfers, Stand-Sit [place] moderate assist (50% patient effort);2 person assist required  -UD      Transfers, Sit-Stand-Sit, Assist Device rolling walker  -UD      Toilet Transfer, [place] moderate assist (50% patient effort);2 person assist required  -UD      Toilet Transfer, Assistive Device rolling walker;bedside commode with drop arms  -UD      Recorded by [UD] Lexis Craft PTA      Gait Assessment/Treatment    Gait, [place] Level minimum assist (75% patient effort);2 person assist required  -UD      Gait, Assistive Device rolling walker  -UD      Gait, Distance (Feet) 30   more sob  -UD      Gait, Gait Deviations humphrey decreased;decreased heel strike;step length decreased;stride length decreased  -UD      Gait, Safety Issues sequencing ability decreased;step length decreased;supplemental O2  -UD      Gait, Impairments strength decreased;impaired balance  -UD      Gait, Comment --   more unsteady,keep chair close by  -UD      Recorded by [UD] Lexis Craft PTA      Therapy Exercises    Bilateral Lower Extremities AROM:;10 reps;sitting  -UD      Recorded by [UD] Lexis Craft PTA      Positioning and Restraints    Pre-Treatment Position in bed  -UD      Post Treatment Position chair  -UD      In Chair notified nsg;reclined;sitting;call light within reach;exit alarm on;waffle cushion;legs elevated  -UD      Recorded by [UD] Lexis Craft PTA        User Key  (r) = Recorded By, (t) = Taken By, (c) = Cosigned By    Initials Name Effective Dates    SHAINA Craft PTA 06/22/15 -                 IP PT Goals       03/07/18 1041 03/06/18 1144       Bed Mobility PT LTG    Bed Mobility PT LTG, Date Established  03/06/18  -SC     Bed  Mobility PT LTG, Time to Achieve  5 - 7 days  -SC     Bed Mobility PT LTG, Activity Type  supine to sit/sit to supine  -SC     Bed Mobility PT LTG, [place] Level  conditional independence  -SC     Bed Mobility PT Goal  LTG, Assist Device  bed rails  -SC     Bed Mobility PT LTG, Outcome goal ongoing  -UD goal ongoing  -SC     Transfer Training PT LTG    Transfer Training PT LTG, Date Established  03/06/18  -SC     Transfer Training PT LTG, Time to Achieve  5 - 7 days  -SC     Transfer Training PT LTG, Activity Type  sit to stand/stand to sit;bed to chair /chair to bed  -SC     Transfer Training PT LTG, [place] Level  contact guard assist  -SC     Transfer Training PT LTG, Assist Device  walker, rolling  -SC     Transfer Training PT LTG, Outcome goal ongoing  -UD goal ongoing  -SC     Gait Training PT LTG    Gait Training Goal PT LTG, Date Established  03/06/18  -SC     Gait Training Goal PT LTG, Time to Achieve  5 - 7 days  -SC     Gait Training Goal PT LTG, [place] Level  contact guard assist  -SC     Gait Training Goal PT LTG, Assist Device  walker, rolling  -SC     Gait Training Goal PT LTG, Distance to Achieve  150  -SC     Gait Training Goal PT LTG, Outcome goal ongoing  -UD goal ongoing  -SC       User Key  (r) = Recorded By, (t) = Taken By, (c) = Cosigned By    Initials Name Provider Type    CHRISTIAN Proctor, PT Physical Therapist    SHAINA Craft PTA Physical Therapy Assistant          Physical Therapy Education     Title: PT OT SLP Therapies (Active)     Topic: Physical Therapy (Active)     Point: Mobility training (Active)    Learning Progress Summary    Learner Readiness Method Response Comment Documented by Status   Patient Acceptance E,D NR  UD 03/07/18 1041 Active    Acceptance E NL reviewed safety with mobility SC 03/06/18 1143 Active               Point: Home exercise program (Active)    Learning Progress Summary    Learner Readiness Method Response Comment Documented by Status    Patient Acceptance E,D NR   03/07/18 1041 Active    Acceptance E NL reviewed safety with mobility SC 03/06/18 1143 Active               Point: Body mechanics (Active)    Learning Progress Summary    Learner Readiness Method Response Comment Documented by Status   Patient Acceptance E,D NR   03/07/18 1041 Active    Acceptance E NL reviewed safety with mobility SC 03/06/18 1143 Active               Point: Precautions (Active)    Learning Progress Summary    Learner Readiness Method Response Comment Documented by Status   Patient Acceptance E,D NR   03/07/18 1041 Active    Acceptance E NL reviewed safety with mobility SC 03/06/18 1143 Active                      User Key     Initials Effective Dates Name Provider Type Discipline    SC 06/19/15 -  Mony Proctor, PT Physical Therapist PT     06/22/15 -  Lexis Craft PTA Physical Therapy Assistant PT                    PT Recommendation and Plan  Anticipated Discharge Disposition: home with assist  Planned Therapy Interventions: bed mobility training, home exercise program, gait training, strengthening, transfer training  PT Frequency: daily  Plan of Care Review  Plan Of Care Reviewed With: patient  Progress: progress towards functional goals is fair  Outcome Summary/Follow up Plan: pt incontinent with stool,applied depends after bedside commode.needs mod. assist for transfer and gait.ambulat 30 ft only,more sob          Outcome Measures       03/07/18 0940 03/06/18 0845       How much help from another person do you currently need...    Turning from your back to your side while in flat bed without using bedrails? 2  -UD 2  -SC     Moving from lying on back to sitting on the side of a flat bed without bedrails? 2  -UD 2  -SC     Moving to and from a bed to a chair (including a wheelchair)? 2  -UD 2  -SC     Standing up from a chair using your arms (e.g., wheelchair, bedside chair)? 2  -UD 3  -SC     Climbing 3-5 steps with a railing? 1  -UD 1  -SC     To walk  in hospital room? 2  -UD 2  -SC     AM-PAC 6 Clicks Score 11  -UD 12  -SC     Functional Assessment    Outcome Measure Options  AM-PAC 6 Clicks Basic Mobility (PT)  -SC       User Key  (r) = Recorded By, (t) = Taken By, (c) = Cosigned By    Initials Name Provider Type    SC Mony Proctor, PT Physical Therapist    UD Lexis Craft PTA Physical Therapy Assistant           Time Calculation:         PT Charges       03/07/18 1043          Time Calculation    PT Received On 03/07/18  -      PT Goal Re-Cert Due Date 03/16/18  -      Time Calculation- PT    Total Timed Code Minutes- PT 25 minute(s)  -UD        User Key  (r) = Recorded By, (t) = Taken By, (c) = Cosigned By    Initials Name Provider Type    SHAINA Craft PTA Physical Therapy Assistant          Therapy Charges for Today     Code Description Service Date Service Provider Modifiers Qty    27837339427 HC PT THER PROC EA 15 MIN 3/7/2018 Lexis Craft, PTA GP 1    37856921812 HC GAIT TRAINING EA 15 MIN 3/7/2018 Lexis Craft, PTA GP 1    27918706677 HC PT THER SUPP EA 15 MIN 3/7/2018 Lexis Craft, ZEYNEP GP 1          PT G-Codes  Outcome Measure Options: AM-PAC 6 Clicks Basic Mobility (PT)  Score: 12  Functional Limitation: Mobility: Walking and moving around  Mobility: Walking and Moving Around Current Status (): At least 60 percent but less than 80 percent impaired, limited or restricted  Mobility: Walking and Moving Around Goal Status (): At least 40 percent but less than 60 percent impaired, limited or restricted    Lexis Craft PTA  3/7/2018

## 2018-03-08 LAB
ANION GAP SERPL CALCULATED.3IONS-SCNC: 7 MMOL/L (ref 3–11)
BUN BLD-MCNC: 14 MG/DL (ref 9–23)
BUN/CREAT SERPL: 15.6 (ref 7–25)
CALCIUM SPEC-SCNC: 9 MG/DL (ref 8.7–10.4)
CHLORIDE SERPL-SCNC: 105 MMOL/L (ref 99–109)
CO2 SERPL-SCNC: 26 MMOL/L (ref 20–31)
CREAT BLD-MCNC: 0.9 MG/DL (ref 0.6–1.3)
GFR SERPL CREATININE-BSD FRML MDRD: 59 ML/MIN/1.73
GLUCOSE BLD-MCNC: 101 MG/DL (ref 70–100)
POTASSIUM BLD-SCNC: 4.9 MMOL/L (ref 3.5–5.5)
SODIUM BLD-SCNC: 138 MMOL/L (ref 132–146)

## 2018-03-08 PROCEDURE — 97116 GAIT TRAINING THERAPY: CPT

## 2018-03-08 PROCEDURE — 96372 THER/PROPH/DIAG INJ SC/IM: CPT

## 2018-03-08 PROCEDURE — 99224 PR SBSQ OBSERVATION CARE/DAY 15 MINUTES: CPT | Performed by: PHYSICIAN ASSISTANT

## 2018-03-08 PROCEDURE — 80048 BASIC METABOLIC PNL TOTAL CA: CPT | Performed by: PHYSICIAN ASSISTANT

## 2018-03-08 PROCEDURE — 94799 UNLISTED PULMONARY SVC/PX: CPT

## 2018-03-08 PROCEDURE — 25010000002 HEPARIN (PORCINE) PER 1000 UNITS: Performed by: NURSE PRACTITIONER

## 2018-03-08 PROCEDURE — G0378 HOSPITAL OBSERVATION PER HR: HCPCS

## 2018-03-08 PROCEDURE — 94640 AIRWAY INHALATION TREATMENT: CPT

## 2018-03-08 PROCEDURE — 97110 THERAPEUTIC EXERCISES: CPT

## 2018-03-08 PROCEDURE — 94760 N-INVAS EAR/PLS OXIMETRY 1: CPT

## 2018-03-08 RX ADMIN — IPRATROPIUM BROMIDE 0.5 MG: 0.5 SOLUTION RESPIRATORY (INHALATION) at 20:04

## 2018-03-08 RX ADMIN — HEPARIN SODIUM 5000 UNITS: 5000 INJECTION, SOLUTION INTRAVENOUS; SUBCUTANEOUS at 16:50

## 2018-03-08 RX ADMIN — HEPARIN SODIUM 5000 UNITS: 5000 INJECTION, SOLUTION INTRAVENOUS; SUBCUTANEOUS at 19:52

## 2018-03-08 RX ADMIN — CARBIDOPA AND LEVODOPA 1 TABLET: 25; 100 TABLET ORAL at 16:50

## 2018-03-08 RX ADMIN — IPRATROPIUM BROMIDE 0.5 MG: 0.5 SOLUTION RESPIRATORY (INHALATION) at 12:37

## 2018-03-08 RX ADMIN — HEPARIN SODIUM 5000 UNITS: 5000 INJECTION, SOLUTION INTRAVENOUS; SUBCUTANEOUS at 06:31

## 2018-03-08 RX ADMIN — AMLODIPINE BESYLATE 5 MG: 5 TABLET ORAL at 08:50

## 2018-03-08 RX ADMIN — CASTOR OIL AND BALSAM, PERU: 788; 87 OINTMENT TOPICAL at 19:42

## 2018-03-08 RX ADMIN — BUDESONIDE AND FORMOTEROL FUMARATE DIHYDRATE 2 PUFF: 160; 4.5 AEROSOL RESPIRATORY (INHALATION) at 07:21

## 2018-03-08 RX ADMIN — HEPARIN SODIUM 5000 UNITS: 5000 INJECTION, SOLUTION INTRAVENOUS; SUBCUTANEOUS at 22:00

## 2018-03-08 RX ADMIN — DONEPEZIL HYDROCHLORIDE 10 MG: 10 TABLET, FILM COATED ORAL at 19:52

## 2018-03-08 RX ADMIN — SERTRALINE HYDROCHLORIDE 50 MG: 50 TABLET ORAL at 08:50

## 2018-03-08 RX ADMIN — ASPIRIN 81 MG: 81 TABLET, COATED ORAL at 08:50

## 2018-03-08 RX ADMIN — CARBIDOPA AND LEVODOPA 1 TABLET: 25; 100 TABLET ORAL at 08:51

## 2018-03-08 RX ADMIN — MICONAZOLE NITRATE 1 APPLICATION: 2 CREAM TOPICAL at 08:51

## 2018-03-08 RX ADMIN — BUDESONIDE AND FORMOTEROL FUMARATE DIHYDRATE 2 PUFF: 160; 4.5 AEROSOL RESPIRATORY (INHALATION) at 20:04

## 2018-03-08 RX ADMIN — CASTOR OIL AND BALSAM, PERU: 788; 87 OINTMENT TOPICAL at 08:53

## 2018-03-08 RX ADMIN — IPRATROPIUM BROMIDE 0.5 MG: 0.5 SOLUTION RESPIRATORY (INHALATION) at 07:20

## 2018-03-08 RX ADMIN — CARBIDOPA AND LEVODOPA 1 TABLET: 25; 100 TABLET ORAL at 13:48

## 2018-03-08 RX ADMIN — Medication 2 TABLET: at 19:57

## 2018-03-08 RX ADMIN — FUROSEMIDE 20 MG: 20 TABLET ORAL at 08:50

## 2018-03-08 RX ADMIN — LISINOPRIL 10 MG: 10 TABLET ORAL at 08:50

## 2018-03-08 RX ADMIN — OXYCODONE HYDROCHLORIDE AND ACETAMINOPHEN 1000 MG: 500 TABLET ORAL at 08:50

## 2018-03-08 RX ADMIN — MULTIPLE VITAMINS W/ MINERALS TAB 1 TABLET: TAB ORAL at 08:50

## 2018-03-08 NOTE — PROGRESS NOTES
Adult Nutrition  Assessment/PES    Patient Name:  Ana Cleary  YOB: 1926  MRN: [id number]  Admit Date:  3/5/2018    Assessment Date:  3/8/2018    Comments:            Reason for Assessment       03/08/18 1040    Reason for Assessment    Reason For Assessment/Visit follow up protocol    Time Spent (min) 30    Diagnosis --   SEE PREVIOUS NUTRITION NOTES    Cardiac Other (comment)   HX OF HF PER [facility] OFFICE NOTES    Metabolic/ICU Lactic acidosis    Neurological Other (comment)   HX OF CVA    Pulmonary/Critical Care Other (comment)   HX OF SOA PER [facility] OFFICE NOTES    Other diagnosis HX OF FREQUENT FALLS              Nutrition/Diet History       03/08/18 1043    Nutrition/Diet History    Functional Status/Food Prep Mobility --   SON REPORTS PT'S WEIGHT HAS BEEN STABLE & IS NOT AWARE OF ANY WEIGHT DECLINE. PT REPORTS FOOD IS GOOD & DENIES FOOD DISLIKES OR INOTLERANCES. SHE REPORTS IS NOT A BIG EATER. SON REPORTS HIS MOTHER'S APPETITE IS NOT GOOD & AGREES ORAL SUPPLEMENTATION MAY     Food Allergies/Intolerances --   ASSIST WITH ENSURING GOOD NUTRITION INTAKE.     Reported/Observed By Patient;Other   & SON              Labs/Tests/Procedures/Meds       03/08/18 1045    Labs/Tests/Procedures/Meds    Labs/Tests Review Reviewed;BNP    Medication Review Diuretic            Physical Findings       03/08/18 1045    Physical Findings/Assessment    Additional Documentation Physical Appearance (Group)    Physical Appearance    Gastrointestinal other (see comments)   WDL PER NURSING DOCUMENTATION              Nutrition Prescription Ordered       03/08/18 1046    Nutrition Prescription PO    Current PO Diet Regular    Common Modifiers Cardiac            Evaluation of Received Nutrient/Fluid Intake       03/08/18 1046    PO Evaluation    Number of Meals 3    % PO Intake 33            Problem/Interventions:          Problem 2       03/08/18 1046    Nutrition Diagnoses Problem 2    Problem 2 Inadequate Intake/Infusion     Inadequate Intake Type Oral    Etiology (related to) Other (comment)   CLINICAL CONDITION    Signs/Symptoms (evidenced by) PO Intake    Percent (%) intake recorded 33 %    Over number of meals 3                  Intervention Goal       03/08/18 1047    Intervention Goal    General Nutrition support treatment    PO Increase intake            Nutrition Intervention       03/08/18 1047    Nutrition Intervention    RD/Tech Action Advise alternate selection;Advise available snack;Interview for preference;Encourage intake;Recommend/ordered;Follow Tx progress;Care plan reviewd    Recommended/Ordered Supplement            Nutrition Prescription       03/08/18 1047    Nutrition Prescription PO    PO Prescription Begin/change supplement    Supplement Boost Plus    Supplement Frequency 2 times a day    New PO Prescription Ordered? Yes            Education/Evaluation       03/08/18 1047    Monitor/Evaluation    Monitor Per protocol;I&O;PO intake;Supplement intake;Pertinent labs;Weight;Skin status;Symptoms        Electronically signed by:  Radha An RD  03/08/18 10:48 AM

## 2018-03-08 NOTE — PROGRESS NOTES
Continued Stay Note  [facility]     Patient Name: Ana Cleary  MRN: [id number]  Today's Date: 3/8/2018    Admit Date: 3/5/2018          Discharge Plan       03/08/18 1557    Case Management/Social Work Plan    Plan Transfer to [facility] long-term bed on Friday    Patient/Family In Agreement With Plan yes    Additional Comments Patient and family have decided that patient will go to [facility] to a long-term care bed tomorrow. Grandtimoteo Cleary (P: [phone]) is signing paperwork at [facility] this afternoon. L[place]to with the [place] is aware and bed is available for tomorrow. Plan for patient to transfer around lunchtime tomorrow. Family will transport.     Nurse to call report to [phone] on Friday. Discharge summary does not need to be faxed as facility liaison has access to [facility]. Please send copy of discharge summary and any paper narcotic scripts if applicable with patient. Thank you. Cierra Medley RN [phone]                Discharge Codes     None        Expected Discharge Date and Time     Expected Discharge Date Expected Discharge Time    Mar 8, 2018             Cierra Medley RN

## 2018-03-08 NOTE — THERAPY TREATMENT NOTE
Acute Care - Physical Therapy Treatment Note  [facility]     Patient Name: Ana Cleary  : 7/3/1926  MRN: [id number]  Today's Date: 3/8/2018  Onset of Illness/Injury or Date of Surgery Date: 18  Date of Referral to PT: 18  Referring Physician: Claudette BAPTISTE    Admit Date: 3/5/2018    Visit Dx:    ICD-10-CM ICD-9-CM   1. Abdominal pain, unspecified abdominal location R10.9 789.00   2. Lactic acidosis E87.2 276.2   3. Decubitus ulcer of right buttock, stage 1 L89.311 707.05     707.21   4. Impaired functional mobility, balance, gait, and endurance Z74.09 V49.89     Patient Active Problem List   Diagnosis   • Hypertension   • Carotid artery disease   • Dementia   • Falls frequently   • Hypotension due to drugs   • Chronic constipation               Adult Rehabilitation Note       18 1030 18 0940       Rehab Assessment/Intervention    Discipline physical therapy assistant  -UD physical therapy assistant  -UD     Document Type therapy note (daily note)  -UD therapy note (daily note)  -UD     Subjective Information agree to therapy;complains of;weakness  -UD agree to therapy;no complaints  -UD     Patient Effort, Rehab Treatment good  -UD good  -UD     Symptoms Noted During/After Treatment fatigue;shortness of breath  -UD fatigue;shortness of breath  -UD     Precautions/Limitations fall precautions;oxygen therapy device and L/min  -UD fall precautions  -UD     Recorded by [UD] Lexis Craft PTA [UD] Lexis Craft PTA     Vital Signs    Post SpO2 (%) 97  -UD      O2 Delivery Post Treatment supplemental O2  -UD supplemental O2  -UD     Pre Patient Position Supine  -UD Supine  -UD     Intra Patient Position Standing  -UD Standing  -UD     Post Patient Position Sitting  -UD Sitting  -UD     Recorded by [UD] Lexis Craft PTA [UD] Lexis Craft PTA     Pain Assessment    Pain Assessment No/denies pain  -UD No/denies pain  -UD     Recorded by [UD] Lexis Craft PTA [UD] Lexis Craft PTA      Cognitive Assessment/Intervention    Orientation Status oriented to;person;situation  -UD oriented to;person  -UD     Follows Commands/Answers Questions 75% of the time  -UD 75% of the time  -UD     Personal Safety Interventions fall prevention program maintained  -UD fall prevention program maintained  -UD     Recorded by [UD] Lexis Craft PTA [UD] Lexis Craft PTA     Bed Mobility, Assessment/Treatment    Bed Mob, Supine to Sit, [place] moderate assist (50% patient effort);verbal cues required  -UD moderate assist (50% patient effort);2 person assist required;verbal cues required  -UD     Recorded by [UD] Lexis Craft PTA [UD] Lexis Craft PTA     Transfer Assessment/Treatment    Transfers, Sit-Stand [place] moderate assist (50% patient effort);2 person assist required  -UD moderate assist (50% patient effort);2 person assist required  -UD     Transfers, Stand-Sit [place] moderate assist (50% patient effort);2 person assist required  -UD moderate assist (50% patient effort);2 person assist required  -UD     Transfers, Sit-Stand-Sit, Assist Device rolling walker  -UD rolling walker  -UD     Toilet Transfer, [place]  moderate assist (50% patient effort);2 person assist required  -UD     Toilet Transfer, Assistive Device  rolling walker;bedside commode with drop arms  -UD     Recorded by [UD] Lexis Craft PTA [UD] Lexis Craft PTA     Gait Assessment/Treatment    Gait, [place] Level minimum assist (75% patient effort);2 person assist required  -UD minimum assist (75% patient effort);2 person assist required  -UD     Gait, Assistive Device rolling walker  -UD rolling walker  -UD     Gait, Distance (Feet) 60  -UD 30   more sob  -UD     Gait, Gait Deviations forward flexed posture;humph[place] decreased  -UD hum[place] decreased;decreased heel strike;step length decreased;stride length decreased  -UD     Gait, Safety Issues step length decreased;supplemental O2  -UD sequencing ability  decreased;step length decreased;supplemental O2  -UD     Gait, Impairments strength decreased;impaired balance  -UD strength decreased;impaired balance  -UD     Gait, Comment --   cues for walker and gait  -UD --   more unsteady,keep chair close by  -UD     Recorded by [UD] Lexis Craft PTA [UD] Lexis Craft PTA     Therapy Exercises    Bilateral Lower Extremities AROM:;10 reps;sitting  -UD AROM:;10 reps;sitting  -UD     Bilateral Upper Extremity AROM:;10 reps;sitting  -UD      Recorded by [UD] Lexis Craft PTA [UD] Lexis Craft PTA     Positioning and Restraints    Pre-Treatment Position in bed  -UD in bed  -UD     Post Treatment Position chair  -UD chair  -UD     In Chair notified nsg;reclined;sitting;call light within reach;exit alarm on;with family/caregiver;legs elevated  -UD notified nsg;reclined;sitting;call light within reach;exit alarm on;waffle cushion;legs elevated  -UD     Recorded by [UD] Lexis Craft PTA [UD] Lexis Craft PTA       User Key  (r) = Recorded By, (t) = Taken By, (c) = Cosigned By    Initials Name Effective Dates    UD Lexis Craft PTA 06/22/15 -                 IP PT Goals       03/08/18 1125 03/07/18 1041 03/06/18 1144    Bed Mobility PT LTG    Bed Mobility PT LTG, Date Established   03/06/18  -SC    Bed Mobility PT LTG, Time to Achieve   5 - 7 days  -SC    Bed Mobility PT LTG, Activity Type   supine to sit/sit to supine  -SC    Bed Mobility PT LTG, [place] Level   conditional independence  -SC    Bed Mobility PT Goal  LTG, Assist Device   bed rails  -SC    Bed Mobility PT LTG, Outcome goal ongoing  -UD goal ongoing  -UD goal ongoing  -SC    Transfer Training PT LTG    Transfer Training PT LTG, Date Established   03/06/18  -SC    Transfer Training PT LTG, Time to Achieve   5 - 7 days  -SC    Transfer Training PT LTG, Activity Type   sit to stand/stand to sit;bed to chair /chair to bed  -SC    Transfer Training PT LTG, [place] Level   contact guard assist  -SC    Transfer  Training PT LTG, Assist Device   walker, rolling  -SC    Transfer Training PT LTG, Outcome goal ongoing  -UD goal ongoing  -UD goal ongoing  -SC    Gait Training PT LTG    Gait Training Goal PT LTG, Date Established   03/06/18  -SC    Gait Training Goal PT LTG, Time to Achieve   5 - 7 days  -SC    Gait Training Goal PT LTG, [place] Level   contact guard assist  -SC    Gait Training Goal PT LTG, Assist Device   walker, rolling  -SC    Gait Training Goal PT LTG, Distance to Achieve   150  -SC    Gait Training Goal PT LTG, Outcome goal ongoing  -UD goal ongoing  -UD goal ongoing  -SC      User Key  (r) = Recorded By, (t) = Taken By, (c) = Cosigned By    Initials Name Provider Type    CHRISTIAN Proctor, PT Physical Therapist    SHAINA Craft, PTA Physical Therapy Assistant          Physical Therapy Education     Title: PT OT SLP Therapies (Done)     Topic: Physical Therapy (Done)     Point: Mobility training (Done)    Learning Progress Summary    Learner Readiness Method Response Comment Documented by Status   Patient Acceptance MACIEJ CONNORS,NR   03/08/18 1125 Done    Acceptance E,D NR   03/07/18 1041 Active    Acceptance E NL reviewed safety with mobility SC 03/06/18 1143 Active   Family Acceptance E,D HOWIE,NR   03/08/18 1125 Done               Point: Home exercise program (Done)    Learning Progress Summary    Learner Readiness Method Response Comment Documented by Status   Patient Acceptance MACIEJ CONNORS,NR   03/08/18 1125 Done    Acceptance E,D NR   03/07/18 1041 Active    Acceptance E NL reviewed safety with mobility SC 03/06/18 1143 Active   Family Acceptance EMACIEJ,NR   03/08/18 1125 Done               Point: Body mechanics (Done)    Learning Progress Summary    Learner Readiness Method Response Comment Documented by Status   Patient Acceptance DORYD HOWIE,NR   03/08/18 1125 Done    Acceptance E,D NR   03/07/18 1041 Active    Acceptance E NL reviewed safety with mobility SC 03/06/18 1143 Active   Family  Acceptance MACIEJ CONNORS,NR   03/08/18 1125 Done               Point: Precautions (Done)    Learning Progress Summary    Learner Readiness Method Response Comment Documented by Status   Patient Acceptance MACIEJ CONNORS,NR   03/08/18 1125 Done    Acceptance MACIEJ CONNORS NR   03/07/18 1041 Active    Acceptance E NL reviewed safety with mobility SC 03/06/18 1143 Active   Family Acceptance MACIEJ CONNORS,NR   03/08/18 1125 Done                      User Key     Initials Effective Dates Name Provider Type Discipline    SC 06/19/15 -  Mony Proctor, PT Physical Therapist PT     06/22/15 -  Lexis Craft, ZEYNEP Physical Therapy Assistant PT                    PT Recommendation and Plan  Anticipated Discharge Disposition: home with assist  Planned Therapy Interventions: bed mobility training, home exercise program, gait training, strengthening, transfer training  PT Frequency: daily  Plan of Care Review  Plan Of Care Reviewed With: patient, family  Progress: improving  Outcome Summary/Follow up Plan: pt better,more alert.still needs mod. assist for tgransfers and gait.ambulat 60 ft toda          Outcome Measures       03/08/18 1030 03/07/18 0940 03/06/18 0845    How much help from another person do you currently need...    Turning from your back to your side while in flat bed without using bedrails? 3  -UD 2  -UD 2  -SC    Moving from lying on back to sitting on the side of a flat bed without bedrails? 2  -UD 2  -UD 2  -SC    Moving to and from a bed to a chair (including a wheelchair)? 2  -UD 2  -UD 2  -SC    Standing up from a chair using your arms (e.g., wheelchair, bedside chair)? 2  -UD 2  -UD 3  -SC    Climbing 3-5 steps with a railing? 1  -UD 1  -UD 1  -SC    To walk in hospital room? 3  -UD 2  -UD 2  -SC    AM-PAC 6 Clicks Score 13  -UD 11  -UD 12  -SC    Functional Assessment    Outcome Measure Options   AM-PAC 6 Clicks Basic Mobility (PT)  -SC      User Key  (r) = Recorded By, (t) = Taken By, (c) = Cosigned By    Initials Name Provider  Type    CHRISTIAN Sandersp, PT Physical Therapist    UD Lexis Craft PTA Physical Therapy Assistant           Time Calculation:         PT Charges       03/08/18 1127          Time Calculation    PT Received On 03/08/18  -UD      PT Goal Re-Cert Due Date 03/16/18  -      Time Calculation- PT    Total Timed Code Minutes- PT 24 minute(s)  -UD        User Key  (r) = Recorded By, (t) = Taken By, (c) = Cosigned By    Initials Name Provider Type    SHAINA Craft PTA Physical Therapy Assistant          Therapy Charges for Today     Code Description Service Date Service Provider Modifiers Qty    31559086587 HC PT THER PROC EA 15 MIN 3/7/2018 Lexis Craft, PTA GP 1    47859333175 HC GAIT TRAINING EA 15 MIN 3/7/2018 Lexis Craft, PTA GP 1    17688598800 HC PT THER SUPP EA 15 MIN 3/7/2018 Lexsi Craft, PTA GP 1    95839644317 HC PT THER PROC EA 15 MIN 3/8/2018 Lexis Craft, PTA GP 1    51513395881 HC GAIT TRAINING EA 15 MIN 3/8/2018 Lexis Craft, PTA GP 1    26480849067 HC PT THER SUPP EA 15 MIN 3/8/2018 Lexis Craft, PTA GP 1          PT G-Codes  Outcome Measure Options: AM-PAC 6 Clicks Basic Mobility (PT)  Score: 12  Functional Limitation: Mobility: Walking and moving around  Mobility: Walking and Moving Around Current Status (): At least 60 percent but less than 80 percent impaired, limited or restricted  Mobility: Walking and Moving Around Goal Status (): At least 40 percent but less than 60 percent impaired, limited or restricted    Lexis Craft PTA  3/8/2018

## 2018-03-08 NOTE — PROGRESS NOTES
"    [facility] Medicine Services  PROGRESS NOTE    Patient Name: Ana Cleary  : 7/3/1926  MRN: [id number]    Date of Admission: 3/5/2018  Length of Stay: 0  Primary Care Physician: Kasey Bethea MD    Subjective     CC: f/u constipation    HPI:  Up in chair, says her butt hurts from sitting for too long. She denies pain or shortness of breath. Oriented to self and hospital. She isn't sure why she's here, \"maybe my breathing?\"     Review of Systems  Gen- No fevers, chills  CV- No chest pain, palpitations  Resp- No cough, dyspnea  GI- No N/V/D, abd pain    Otherwise ROS is negative except as mentioned in the HPI.    Objective     Vital Signs:   Temp:  [98.2 °F (36.8 °C)-98.5 °F (36.9 °C)] 98.2 °F (36.8 °C)  Heart Rate:  [62-97] 66  Resp:  [16-18] 16  BP: (138-144)/(62-65) 144/65        Physical Exam:  Constitutional: No acute distress, awake, alert  HENT: NCAT, mucous membranes moist  Respiratory: Clear to auscultation bilaterally, respiratory effort normal   Cardiovascular: RRR, no murmurs, rubs, or gallops, palpable pedal pulses bilaterally  Gastrointestinal: Positive bowel sounds, soft, nontender, nondistended  Musculoskeletal: No bilateral ankle edema  Psychiatric: Appropriate affect, cooperative  Neurologic: Oriented to self and hospital (not to city, time or situation). Strength symmetric in all extremities, Cranial Nerves grossly intact to confrontation, speech clear  Skin: No rashes. Stage II decubitus ulcer to buttock, examined 3/7, patient up in chair today, unable to view     Results Reviewed:  I have personally reviewed current lab, radiology, and data and agree.      Results from last 7 days  Lab Units 18  0556 18  0100   WBC 10*3/mm3 11.64* 8.87   HEMOGLOBIN g/dL 12.5 13.4   HEMATOCRIT % 40.7 42.5   PLATELETS 10*3/mm3 251 287       Results from last 7 days  Lab Units 18  1227 18  0437 18  0556 18  0100   SODIUM mmol/L 138 143 142 135 "   POTASSIUM mmol/L 4.9 5.9* 3.1* 3.4*   CHLORIDE mmol/L 105 115* 104 98*   CO2 mmol/L 26.0 27.0 26.0 22.0   BUN mg/dL 14 13 14 16   CREATININE mg/dL 0.90 0.90 1.00 1.10   GLUCOSE mg/dL 101* 78 80 91   CALCIUM mg/dL 9.0 8.9 8.8 9.6   ALT (SGPT) U/L  --   --  5* 6*   AST (SGOT) U/L  --   --  9 12     Estimated Creatinine Clearance: 35 mL/min (by C-G formula based on Cr of 0.9).     No results found for: BNP  No results found for: PHART    Microbiology Results Abnormal     None        Imaging Results (last 24 hours)     ** No results found for the last 24 hours. **        I have reviewed the medications.    Assessment / Plan     Hospital Problem List     * (Principal)Chronic constipation    Hypertension    Dementia    Falls frequently         Brief Hospital Course to date:  Ana Cleary is a 89 y.o. female brought to [facility] ED on 3/6/18 for abdominal pain and issues with her chronic constipation. Also found to have stage II ulcer to buttock. Her nursing facility has opted not to accept her back due to increasing care needs and progressive decline. CM following.     Chronic constipation  - Bowels have moved. Needs more aggressive daily bowel regimen at d/c.     Stage II ulcer, L buttock  - WOC following, continue Venelex ointment and frequent repositioning    HTN, stable, continue home meds    Frequent falls  - PT/OT following,    Dementia, continue home meds.    DVT Prophylaxis:  SQ Heparin  CODE STATUS: Full Code    Disposition: I expect the patient to be discharged to SNF when bed available    Electronically signed by Lisa Perez PA-C, 03/08/18, 1:33 PM.

## 2018-03-08 NOTE — PROGRESS NOTES
Continued Stay Note  [facility]     Patient Name: Ana Cleary  MRN: [id number]  Today's Date: 3/8/2018    Admit Date: 3/5/2018          Discharge Plan     Consent obtained for the participation in the [facility] Transitions Program. Natalia Wilkins RN                Discharge Codes     None        Expected Discharge Date and Time     Expected Discharge Date Expected Discharge Time    Mar 8, 2018             Natalia Wilkins RN

## 2018-03-08 NOTE — PLAN OF CARE
Problem: Patient Care Overview (Adult)  Goal: Plan of Care Review  Outcome: Ongoing (interventions implemented as appropriate)   03/08/18 1125   Coping/Psychosocial Response Interventions   Plan Of Care Reviewed With patient;family   Patient Care Overview   Progress improving   Outcome Evaluation   Outcome Summary/Follow up Plan pt better,more alert.still needs mod. assist for tgransfers and gait.ambulat 60 ft toda       Problem: Inpatient Physical Therapy  Goal: Bed Mobility Goal LTG- PT  Outcome: Ongoing (interventions implemented as appropriate)   03/06/18 1144 03/08/18 1125   Bed Mobility PT LTG   Bed Mobility PT LTG, Date Established 03/06/18 --    Bed Mobility PT LTG, Time to Achieve 5 - 7 days --    Bed Mobility PT LTG, Activity Type supine to sit/sit to supine --    Bed Mobility PT LTG, [place] Level conditional independence --    Bed Mobility PT Goal LTG, Assist Device bed rails --    Bed Mobility PT LTG, Outcome --  goal ongoing     Goal: Transfer Training Goal 1 LTG- PT  Outcome: Ongoing (interventions implemented as appropriate)   03/06/18 1144 03/08/18 1125   Transfer Training PT LTG   Transfer Training PT LTG, Date Established 03/06/18 --    Transfer Training PT LTG, Time to Achieve 5 - 7 days --    Transfer Training PT LTG, Activity Type sit to stand/stand to sit;bed to chair /chair to bed --    Transfer Training PT LTG, [place] Level contact guard assist --    Transfer Training PT LTG, Assist Device walker, rolling --    Transfer Training PT LTG, Outcome --  goal ongoing     Goal: Gait Training Goal LTG- PT  Outcome: Ongoing (interventions implemented as appropriate)   03/06/18 1144 03/08/18 1125   Gait Training PT LTG   Gait Training Goal PT LTG, Date Established 03/06/18 --    Gait Training Goal PT LTG, Time to Achieve 5 - 7 days --    Gait Training Goal PT LTG, [place] Level contact guard assist --    Gait Training Goal PT LTG, Assist Device walker, rolling --    Gait Training Goal  PT LTG, Distance to Achieve 150 --    Gait Training Goal PT LTG, Outcome --  goal ongoing

## 2018-03-09 VITALS
TEMPERATURE: 97.2 F | OXYGEN SATURATION: 97 % | RESPIRATION RATE: 16 BRPM | SYSTOLIC BLOOD PRESSURE: 116 MMHG | BODY MASS INDEX: 23.93 KG/M2 | DIASTOLIC BLOOD PRESSURE: 56 MMHG | WEIGHT: 121.9 LBS | HEIGHT: 60 IN | HEART RATE: 76 BPM

## 2018-03-09 PROCEDURE — 94640 AIRWAY INHALATION TREATMENT: CPT

## 2018-03-09 PROCEDURE — 94799 UNLISTED PULMONARY SVC/PX: CPT

## 2018-03-09 PROCEDURE — 99217 PR OBSERVATION CARE DISCHARGE MANAGEMENT: CPT | Performed by: PHYSICIAN ASSISTANT

## 2018-03-09 PROCEDURE — 97116 GAIT TRAINING THERAPY: CPT

## 2018-03-09 PROCEDURE — 97110 THERAPEUTIC EXERCISES: CPT

## 2018-03-09 PROCEDURE — 25010000002 HEPARIN (PORCINE) PER 1000 UNITS: Performed by: NURSE PRACTITIONER

## 2018-03-09 PROCEDURE — G0378 HOSPITAL OBSERVATION PER HR: HCPCS

## 2018-03-09 PROCEDURE — 94760 N-INVAS EAR/PLS OXIMETRY 1: CPT

## 2018-03-09 PROCEDURE — 96372 THER/PROPH/DIAG INJ SC/IM: CPT

## 2018-03-09 RX ORDER — ACETAMINOPHEN 325 MG/1
650 TABLET ORAL EVERY 4 HOURS PRN
Start: 2018-03-09

## 2018-03-09 RX ORDER — SENNA AND DOCUSATE SODIUM 50; 8.6 MG/1; MG/1
2 TABLET, FILM COATED ORAL 2 TIMES DAILY
Start: 2018-03-09

## 2018-03-09 RX ADMIN — FUROSEMIDE 20 MG: 20 TABLET ORAL at 09:46

## 2018-03-09 RX ADMIN — LISINOPRIL 10 MG: 10 TABLET ORAL at 09:45

## 2018-03-09 RX ADMIN — OXYCODONE HYDROCHLORIDE AND ACETAMINOPHEN 1000 MG: 500 TABLET ORAL at 09:46

## 2018-03-09 RX ADMIN — HEPARIN SODIUM 5000 UNITS: 5000 INJECTION, SOLUTION INTRAVENOUS; SUBCUTANEOUS at 05:21

## 2018-03-09 RX ADMIN — POLYETHYLENE GLYCOL (3350) 17 G: 17 POWDER, FOR SOLUTION ORAL at 09:45

## 2018-03-09 RX ADMIN — SERTRALINE HYDROCHLORIDE 50 MG: 50 TABLET ORAL at 09:45

## 2018-03-09 RX ADMIN — BUDESONIDE AND FORMOTEROL FUMARATE DIHYDRATE 2 PUFF: 160; 4.5 AEROSOL RESPIRATORY (INHALATION) at 07:00

## 2018-03-09 RX ADMIN — CASTOR OIL AND BALSAM, PERU: 788; 87 OINTMENT TOPICAL at 09:46

## 2018-03-09 RX ADMIN — ASPIRIN 81 MG: 81 TABLET, COATED ORAL at 09:45

## 2018-03-09 RX ADMIN — MULTIPLE VITAMINS W/ MINERALS TAB 1 TABLET: TAB ORAL at 09:46

## 2018-03-09 RX ADMIN — CARBIDOPA AND LEVODOPA 1 TABLET: 25; 100 TABLET ORAL at 11:50

## 2018-03-09 RX ADMIN — MICONAZOLE NITRATE 1 APPLICATION: 2 CREAM TOPICAL at 09:47

## 2018-03-09 RX ADMIN — CARBIDOPA AND LEVODOPA 1 TABLET: 25; 100 TABLET ORAL at 09:46

## 2018-03-09 RX ADMIN — IPRATROPIUM BROMIDE 0.5 MG: 0.5 SOLUTION RESPIRATORY (INHALATION) at 13:05

## 2018-03-09 RX ADMIN — AMLODIPINE BESYLATE 5 MG: 5 TABLET ORAL at 09:46

## 2018-03-09 RX ADMIN — IPRATROPIUM BROMIDE 0.5 MG: 0.5 SOLUTION RESPIRATORY (INHALATION) at 07:00

## 2018-03-09 NOTE — THERAPY TREATMENT NOTE
Acute Care - Physical Therapy Treatment Note  [facility]     Patient Name: Ana Cleary  : 7/3/1926  MRN: [id number]  Today's Date: 3/9/2018  Onset of Illness/Injury or Date of Surgery Date: 18  Date of Referral to PT: 18  Referring Physician: Claudette BAPTISTE    Admit Date: 3/5/2018    Visit Dx:    ICD-10-CM ICD-9-CM   1. Abdominal pain, unspecified abdominal location R10.9 789.00   2. Lactic acidosis E87.2 276.2   3. Decubitus ulcer of right buttock, stage 1 L89.311 707.05     707.21   4. Impaired functional mobility, balance, gait, and endurance Z74.09 V49.89     Patient Active Problem List   Diagnosis   • Hypertension   • Carotid artery disease   • Dementia   • Falls frequently   • Hypotension due to drugs   • Chronic constipation               Adult Rehabilitation Note       18 0940 18 1030 18 0940    Rehab Assessment/Intervention    Discipline physical therapy assistant  -UD physical therapy assistant  -UD physical therapy assistant  -UD    Document Type therapy note (daily note)  -UD therapy note (daily note)  -UD therapy note (daily note)  -UD    Subjective Information agree to therapy;complains of;weakness  -UD agree to therapy;complains of;weakness  -UD agree to therapy;no complaints  -UD    Patient Effort, Rehab Treatment good  -UD good  -UD good  -UD    Symptoms Noted During/After Treatment fatigue;shortness of breath  -UD fatigue;shortness of breath  -UD fatigue;shortness of breath  -UD    Precautions/Limitations fall precautions;oxygen therapy device and L/min  -UD fall precautions;oxygen therapy device and L/min  -UD fall precautions  -UD    Specific Treatment Considerations --   put depends on  -UD      Recorded by [UD] Lexis Craft, ZEYNEP [UD] Lexis Craft PTA [UD] Lexis Craft PTA    Vital Signs    Post SpO2 (%)  97  -UD     O2 Delivery Post Treatment supplemental O2  -UD supplemental O2  -UD supplemental O2  -UD    Pre Patient Position Supine  -UD Supine  -UD Supine   -UD    Intra Patient Position Standing  -UD Standing  -UD Standing  -UD    Post Patient Position Sitting  -UD Sitting  -UD Sitting  -UD    Recorded by [UD] Lexis Craft, PTA [UD] Lexis Craft, PTA [UD] Lexis Craft, PTA    Pain Assessment    Pain Assessment No/denies pain  -UD No/denies pain  -UD No/denies pain  -UD    Recorded by [UD] Lexis Craft, PTA [UD] Lexis Craft, PTA [UD] Lexis Craft, PTA    Cognitive Assessment/Intervention    Orientation Status oriented to;person  -UD oriented to;person;situation  -UD oriented to;person  -UD    Follows Commands/Answers Questions 75% of the time  -UD 75% of the time  -UD 75% of the time  -UD    Personal Safety Interventions fall prevention program maintained  -UD fall prevention program maintained  -UD fall prevention program maintained  -UD    Recorded by [UD] Lexis Craft, PTA [UD] Lexis Craft, PTA [UD] Lexis Craft, PTA    Bed Mobility, Assessment/Treatment    Bed Mobility, Roll Left, [place] moderate assist (50% patient effort)  -UD      Bed Mobility, Roll Right, [place] moderate assist (50% patient effort)  -UD      Bed Mobility, Scoot/Bridge, [place] moderate assist (50% patient effort)  -UD      Bed Mob, Supine to Sit, [place] moderate assist (50% patient effort)  -UD moderate assist (50% patient effort);verbal cues required  -UD moderate assist (50% patient effort);2 person assist required;verbal cues required  -UD    Recorded by [UD] Lexis Craft, PTA [UD] Lexis Craft, PTA [UD] Lexis Craft, PTA    Transfer Assessment/Treatment    Transfers, Sit-Stand [place] moderate assist (50% patient effort);2 person assist required  -UD moderate assist (50% patient effort);2 person assist required  -UD moderate assist (50% patient effort);2 person assist required  -UD    Transfers, Stand-Sit [place] minimum assist (75% patient effort);2 person assist required  -UD moderate assist (50% patient effort);2 person assist required  -UD moderate  assist (50% patient effort);2 person assist required  -UD    Transfers, Sit-Stand-Sit, Assist Device rolling walker  -UD rolling walker  -UD rolling walker  -UD    Toilet Transfer, [place]   moderate assist (50% patient effort);2 person assist required  -UD    Toilet Transfer, Assistive Device   rolling walker;bedside commode with drop arms  -UD    Recorded by [UD] Lexis Craft PTA [UD] Lexis Craft PTA [UD] Lexis Craft PTA    Gait Assessment/Treatment    Gait, [place] Level minimum assist (75% patient effort);2 person assist required  -UD minimum assist (75% patient effort);2 person assist required  -UD minimum assist (75% patient effort);2 person assist required  -UD    Gait, Assistive Device rolling walker  -UD rolling walker  -UD rolling walker  -UD    Gait, Distance (Feet) 100  -UD 60  -UD 30   more sob  -UD    Gait, Gait Deviations [place] decreased;forward flexed posture;step length decreased  -UD forward flexed posture;[place] decreased  -UD [place] decreased;decreased heel strike;step length decreased;stride length decreased  -UD    Gait, Safety Issues step length decreased;supplemental O2  -UD step length decreased;supplemental O2  -UD sequencing ability decreased;step length decreased;supplemental O2  -UD    Gait, Impairments strength decreased;impaired balance  -UD strength decreased;impaired balance  -UD strength decreased;impaired balance  -UD    Gait, Comment --   cues for posture  -UD --   cues for walker and gait  -UD --   more unsteady,keep chair close by  -UD    Recorded by [UD] Lexis Craft PTA [UD] Lexis Craft PTA [UD] Lexis Craft PTA    Therapy Exercises    Bilateral Lower Extremities AROM:;10 reps;sitting  -UD AROM:;10 reps;sitting  -UD AROM:;10 reps;sitting  -UD    Bilateral Upper Extremity  AROM:;10 reps;sitting  -UD     Recorded by [UD] Lexsi Craft PTA [UD] Lexis Craft PTA [UD] Lexis Craft PTA    Positioning and Restraints    Pre-Treatment Position in bed  -UD in  bed  -UD in bed  -UD    Post Treatment Position chair  -UD chair  -UD chair  -UD    In Chair notified nsg;reclined;sitting;call light within reach;exit alarm on;with other staff;legs elevated  -UD notified nsg;reclined;sitting;call light within reach;exit alarm on;with family/caregiver;legs elevated  -UD notified nsg;reclined;sitting;call light within reach;exit alarm on;waffle cushion;legs elevated  -UD    Recorded by [UD] Lexis Craft PTA [UD] Lexis Craft PTA [UD] Lexis Craft PTA      User Key  (r) = Recorded By, (t) = Taken By, (c) = Cosigned By    Initials Name Effective Dates    UD Lexis Craft PTA 06/22/15 -                 IP PT Goals       03/09/18 1045 03/08/18 1125 03/07/18 1041    Bed Mobility PT LTG    Bed Mobility PT LTG, Outcome goal ongoing  -UD goal ongoing  -UD goal ongoing  -UD    Transfer Training PT LTG    Transfer Training PT LTG, Outcome goal ongoing  -UD goal ongoing  -UD goal ongoing  -UD    Gait Training PT LTG    Gait Training Goal PT LTG, Outcome goal ongoing  -UD goal ongoing  -UD goal ongoing  -UD      03/06/18 1144          Bed Mobility PT LTG    Bed Mobility PT LTG, Date Established 03/06/18  -SC      Bed Mobility PT LTG, Time to Achieve 5 - 7 days  -SC      Bed Mobility PT LTG, Activity Type supine to sit/sit to supine  -SC      Bed Mobility PT LTG, [place] Level conditional independence  -SC      Bed Mobility PT Goal  LTG, Assist Device bed rails  -SC      Bed Mobility PT LTG, Outcome goal ongoing  -SC      Transfer Training PT LTG    Transfer Training PT LTG, Date Established 03/06/18  -SC      Transfer Training PT LTG, Time to Achieve 5 - 7 days  -SC      Transfer Training PT LTG, Activity Type sit to stand/stand to sit;bed to chair /chair to bed  -SC      Transfer Training PT LTG, [place] Level contact guard assist  -SC      Transfer Training PT LTG, Assist Device walker, rolling  -SC      Transfer Training PT LTG, Outcome goal ongoing  -SC      Gait Training PT  LTG    Gait Training Goal PT LTG, Date Established 03/06/18  -SC      Gait Training Goal PT LTG, Time to Achieve 5 - 7 days  -SC      Gait Training Goal PT LTG, [place] Level contact guard assist  -SC      Gait Training Goal PT LTG, Assist Device walker, rolling  -SC      Gait Training Goal PT LTG, Distance to Achieve 150  -SC      Gait Training Goal PT LTG, Outcome goal ongoing  -SC        User Key  (r) = Recorded By, (t) = Taken By, (c) = Cosigned By    Initials Name Provider Type    SC Mony Proctor, PT Physical Therapist    SHAINA Craft, PTA Physical Therapy Assistant          Physical Therapy Education     Title: PT OT SLP Therapies (Done)     Topic: Physical Therapy (Done)     Point: Mobility training (Done)    Learning Progress Summary    Learner Readiness Method Response Comment Documented by Status   Patient Acceptance E,D HOWIE,NR   03/09/18 1045 Done    Acceptance E,D DU,NR   03/08/18 1125 Done    Acceptance E,D NR   03/07/18 1041 Active    Acceptance E NL reviewed safety with mobility SC 03/06/18 1143 Active   Family Acceptance E,D HOWIE,NR   03/08/18 1125 Done               Point: Home exercise program (Done)    Learning Progress Summary    Learner Readiness Method Response Comment Documented by Status   Patient Acceptance E,D HOWIE,NR   03/09/18 1045 Done    Acceptance E,D DU,NR   03/08/18 1125 Done    Acceptance E,D NR   03/07/18 1041 Active    Acceptance E NL reviewed safety with mobility SC 03/06/18 1143 Active   Family Acceptance E,D HOWIE,NR   03/08/18 1125 Done               Point: Body mechanics (Done)    Learning Progress Summary    Learner Readiness Method Response Comment Documented by Status   Patient Acceptance E,D DU,NR   03/09/18 1045 Done    Acceptance E,D DU,NR   03/08/18 1125 Done    Acceptance E,D NR   03/07/18 1041 Active    Acceptance E NL reviewed safety with mobility SC 03/06/18 1143 Active   Family Acceptance E,D DU,NR   03/08/18 1125 Done                Point: Precautions (Done)    Learning Progress Summary    Learner Readiness Method Response Comment Documented by Status   Patient Acceptance E,D DU,NR   03/09/18 1045 Done    Acceptance E,D DU,NR   03/08/18 1125 Done    Acceptance E,D NR  UD 03/07/18 1041 Active    Acceptance E NL reviewed safety with mobility SC 03/06/18 1143 Active   Family Acceptance E,D DU,NR   03/08/18 1125 Done                      User Key     Initials Effective Dates Name Provider Type Discipline    SC 06/19/15 -  Mony Proctor, PT Physical Therapist PT     06/22/15 -  Lexis Craft PTA Physical Therapy Assistant PT                    PT Recommendation and Plan  Anticipated Discharge Disposition: home with assist  Planned Therapy Interventions: bed mobility training, home exercise program, gait training, strengthening, transfer training  PT Frequency: daily  Plan of Care Review  Plan Of Care Reviewed With: patient  Progress: improving  Outcome Summary/Follow up Plan: pt better,still needs mod. assist .for transfers and gait.ambulat 100 ft today.needs lots of verbal cues          Outcome Measures       03/09/18 0940 03/08/18 1030 03/07/18 0940    How much help from another person do you currently need...    Turning from your back to your side while in flat bed without using bedrails? 2  -UD 3  -UD 2  -UD    Moving from lying on back to sitting on the side of a flat bed without bedrails? 2  -UD 2  -UD 2  -UD    Moving to and from a bed to a chair (including a wheelchair)? 2  -UD 2  -UD 2  -UD    Standing up from a chair using your arms (e.g., wheelchair, bedside chair)? 2  -UD 2  -UD 2  -UD    Climbing 3-5 steps with a railing? 1  -UD 1  -UD 1  -UD    To walk in hospital room? 3  -UD 3  -UD 2  -UD    AM-PAC 6 Clicks Score 12  -UD 13  -UD 11  -UD      User Key  (r) = Recorded By, (t) = Taken By, (c) = Cosigned By    Initials Name Provider Type    UD Lexis Craft PTA Physical Therapy Assistant           Time Calculation:         PT  Charges       03/09/18 1049          Time Calculation    PT Received On 03/09/18  -UD      PT Goal Re-Cert Due Date 03/16/18  -UD      Time Calculation- PT    Total Timed Code Minutes- PT 24 minute(s)  -UD        User Key  (r) = Recorded By, (t) = Taken By, (c) = Cosigned By    Initials Name Provider Type    UD Lexis Craft PTA Physical Therapy Assistant          Therapy Charges for Today     Code Description Service Date Service Provider Modifiers Qty    82134960390 HC PT THER PROC EA 15 MIN 3/8/2018 Lexis Craft, PTA GP 1    80143738213 HC GAIT TRAINING EA 15 MIN 3/8/2018 Lexis Craft, PTA GP 1    42290738092 HC PT THER SUPP EA 15 MIN 3/8/2018 Lexis Craft, PTA GP 1    81965291761 HC PT THER PROC EA 15 MIN 3/9/2018 Lexis Craft, PTA GP 1    55311864165 HC GAIT TRAINING EA 15 MIN 3/9/2018 Lexis Craft, PTA GP 1    54104780054 HC PT THER SUPP EA 15 MIN 3/9/2018 Lexis Craft, ZEYNEP GP 1          PT G-Codes  Outcome Measure Options: AM-PAC 6 Clicks Basic Mobility (PT)  Score: 12  Functional Limitation: Mobility: Walking and moving around  Mobility: Walking and Moving Around Current Status (): At least 60 percent but less than 80 percent impaired, limited or restricted  Mobility: Walking and Moving Around Goal Status (): At least 40 percent but less than 60 percent impaired, limited or restricted    Lexis Craft PTA  3/9/2018

## 2018-03-09 NOTE — PLAN OF CARE
Problem: Patient Care Overview (Adult)  Goal: Plan of Care Review  Outcome: Ongoing (interventions implemented as appropriate)   03/09/18 0615   Coping/Psychosocial Response Interventions   Plan Of Care Reviewed With patient   Patient Care Overview   Progress progress toward functional goals is gradual   Outcome Evaluation   Outcome Summary/Follow up Plan Pt more alert and oriented, will continue to monitor       Problem: Skin Integrity Impairment, Risk/Actual (Adult)  Goal: Identify Related Risk Factors and Signs and Symptoms  Outcome: Ongoing (interventions implemented as appropriate)    Goal: Skin Integrity/Wound Healing  Outcome: Ongoing (interventions implemented as appropriate)

## 2018-03-09 NOTE — PLAN OF CARE
Problem: Patient Care Overview (Adult)  Goal: Plan of Care Review  Outcome: Ongoing (interventions implemented as appropriate)   03/09/18 1045   Coping/Psychosocial Response Interventions   Plan Of Care Reviewed With patient   Patient Care Overview   Progress improving   Outcome Evaluation   Outcome Summary/Follow up Plan pt better,still needs mod. assist .for transfers and gait.ambulat 100 ft today.needs lots of verbal cues       Problem: Inpatient Physical Therapy  Goal: Bed Mobility Goal LTG- PT  Outcome: Ongoing (interventions implemented as appropriate)   03/06/18 1144 03/09/18 1045   Bed Mobility PT LTG   Bed Mobility PT LTG, Date Established 03/06/18 --    Bed Mobility PT LTG, Time to Achieve 5 - 7 days --    Bed Mobility PT LTG, Activity Type supine to sit/sit to supine --    Bed Mobility PT LTG, [place] Level conditional independence --    Bed Mobility PT Goal LTG, Assist Device bed rails --    Bed Mobility PT LTG, Outcome --  goal ongoing     Goal: Transfer Training Goal 1 LTG- PT  Outcome: Ongoing (interventions implemented as appropriate)   03/06/18 1144 03/09/18 1045   Transfer Training PT LTG   Transfer Training PT LTG, Date Established 03/06/18 --    Transfer Training PT LTG, Time to Achieve 5 - 7 days --    Transfer Training PT LTG, Activity Type sit to stand/stand to sit;bed to chair /chair to bed --    Transfer Training PT LTG, [place] Level contact guard assist --    Transfer Training PT LTG, Assist Device walker, rolling --    Transfer Training PT LTG, Outcome --  goal ongoing     Goal: Gait Training Goal LTG- PT  Outcome: Ongoing (interventions implemented as appropriate)   03/06/18 1144 03/09/18 1045   Gait Training PT LTG   Gait Training Goal PT LTG, Date Established 03/06/18 --    Gait Training Goal PT LTG, Time to Achieve 5 - 7 days --    Gait Training Goal PT LTG, [place] Level contact guard assist --    Gait Training Goal PT LTG, Assist Device walker, rolling --    Gait  Training Goal PT LTG, Distance to Achieve 150 --    Gait Training Goal PT LTG, Outcome --  goal ongoing

## 2018-03-09 NOTE — DISCHARGE SUMMARY
"    [facility] Medicine Services  DISCHARGE SUMMARY    Patient Name: Ana Cleary  : 7/3/1926  MRN: [id number]    Date of Admission: 3/5/2018  Date of Discharge:  3/9/18  Primary Care Physician: Kasey Bethea MD     Hospital Course     Presenting Problem:   Abdominal pain, unspecified abdominal location [R10.9]    Active Hospital Problems (** Indicates Principal Problem)    Diagnosis Date Noted   • **Chronic constipation [K59.09] 2018   • Dementia [F03.90]    • Falls frequently [R29.6]    • Hypertension [I10]       Resolved Hospital Problems    Diagnosis Date Noted Date Resolved   • Abdominal pain [R10.9] 2018   • Lactic acidosis [E87.2] 2018   • Hypokalemia [E87.6] 2018      Hospital Course:  Ms. Ana Cleary is a 89yo female with PMH significant for HTN, dementia and chronic constipation.  She was brought to [facility] ED from her nursing home ([facility]) on 3/6/18 for abdominal pain and issues with her chronic constipation.  Patient was also found to have a stage II pressure ulcer to her left buttock.  Patient's constipation was resolved with a bowel regimen and at discharge, she will be prescribed a more aggressive daily bowel regimen to prevent continued constipation.  [facility] was ready for discharge however, the facility felt that they were no longer able to meet her needs and other placement options were pursued.  Ms. Cleary will transfer to the [place] in stable condition on 3/9/18.    Day of Discharge     HPI:   Sitting up in bed, eating breakfast. She has no complaints and says \"I feel fine.\" Was unaware she was in the hospital.     Review of Systems  Gen- No fevers, chills  CV- No chest pain, palpitations  Resp- No cough, dyspnea  GI- No N/V/D, abd pain    Otherwise ROS is negative except as mentioned in the HPI.    Vital Signs:   Temp:  [97.2 °F (36.2 °C)-98.4 " °F (36.9 °C)] 97.2 °F (36.2 °C)  Heart Rate:  [66-74] 74  Resp:  [16-18] 16  BP: (116-129)/(56-63) 116/56     Physical Exam:  Constitutional: No acute distress, awake, alert  HENT: NCAT, mucous membranes moist  Respiratory: Clear to auscultation bilaterally, respiratory effort normal   Cardiovascular: RRR, no murmurs, rubs, or gallops, palpable pedal pulses bilaterally  Gastrointestinal: Positive bowel sounds, soft, nontender, nondistended  Musculoskeletal: No bilateral ankle edema  Psychiatric: Appropriate affect, cooperative  Neurologic: Oriented to self only, strength symmetric in all extremities, Cranial Nerves grossly intact to confrontation, speech clear  Skin: No rashes    Pertinent  and/or Most Recent Results       Results from last 7 days  Lab Units 03/08/18  1227 03/07/18  0437 03/06/18  0556 03/06/18  0100   WBC 10*3/mm3  --   --  11.64* 8.87   HEMOGLOBIN g/dL  --   --  12.5 13.4   HEMATOCRIT %  --   --  40.7 42.5   PLATELETS 10*3/mm3  --   --  251 287   SODIUM mmol/L 138 143 142 135   POTASSIUM mmol/L 4.9 5.9* 3.1* 3.4*   CHLORIDE mmol/L 105 115* 104 98*   CO2 mmol/L 26.0 27.0 26.0 22.0   BUN mg/dL 14 13 14 16   CREATININE mg/dL 0.90 0.90 1.00 1.10   GLUCOSE mg/dL 101* 78 80 91   CALCIUM mg/dL 9.0 8.9 8.8 9.6       Results from last 7 days  Lab Units 03/06/18  0556 03/06/18  0100   BILIRUBIN mg/dL 1.0 0.9   ALK PHOS U/L 71 82   ALT (SGPT) U/L 5* 6*   AST (SGOT) U/L 9 12         Brief Urine Lab Results  (Last result in the past 365 days)      Color   Clarity   Blood   Leuk Est   Nitrite   Protein   CREAT   Urine HCG        03/06/18 0116 Yellow Clear Negative Trace(A) Negative Negative             Microbiology Results Abnormal     None        Imaging Results (all)     Procedure Component Value Units Date/Time    XR Abdomen 2 View With Chest 1 View [[id number]] Collected:  03/06/18 0026     Updated:  03/06/18 0150    Narrative:       EXAM:    XR Abdomen 2 Views With XR Chest    CLINICAL HISTORY:    89 years  old, female; Pain; Abdominal pain; Generalized; Patient HX: Patient   complains of entire abdominal pain and distention patient's family member   states that the patient was seen here at [facility] on 2/28/2018   for the same problem and it has not resolved. HX: Non-hospital hypertension   carotid artery disease dementia falls frequently hypotension due to drugs;   Additional info: Abd pain    TECHNIQUE:    Frontal view of the chest, frontal view of the abdomen/pelvis and upright or   decubitus view of the abdomen.    COMPARISON:    CR - XR CHEST 1 VW 2018-02-28 22:16    FINDINGS:    Lungs:  There is pulmonary granulomatous scarring.    Pleural space:  Unremarkable.  No pneumothorax.    Heart:  Unremarkable.  No cardiomegaly.    Mediastinum:  Unremarkable.    Intraperitoneal space:  No free air.    Gastrointestinal tract:  Unremarkable.  No dilation.    Bones/joints:  There is scoliosis with degenerative changes of the spine.      Impression:         No acute findings.    THIS DOCUMENT HAS BEEN ELECTRONICALLY SIGNED BY TYRESE LE MD    CT Angiogram Chest With Contrast [[id number]] Collected:  03/06/18 0259     Updated:  03/06/18 0431    Narrative:       EXAM:    CT Angiography Chest With Intravenous Contrast    CLINICAL HISTORY:    89 years old, female; Acidosis; Pressure ulcer of right buttock, stage 1;   Unspecified abdominal pain; Signs and symptoms; Shortness of breath; Additional   info: Chest pain, SOA    TECHNIQUE:    Axial computed tomographic angiography images of the chest with intravenous   contrast using pulmonary embolism protocol.  All CT scans at this facility use   one or more dose reduction techniques, viz.: automated exposure control; ma/kV   adjustment per patient size (including targeted exams where dose is matched to   indication; i.e. head); or iterative reconstruction technique.    MIP reconstructed images were created and reviewed.    CONTRAST:    40 mL of isovue 370  administered intravenously.    COMPARISON:    CT CHEST W WO CONTRAST 2017-08-31 11:08    FINDINGS:    Pulmonary arteries:  No pulmonary embolism.    Aorta:  Moderate atherosclerotic changes.  No thoracic aortic aneurysm.    Lungs:  Calcified lingular granuloma.  Hazy areas of groundglass opacity in   the right midlung field, decreased.  Scarring medially in the right lung base,   unchanged.  Atelectasis medially in the left lower lobe with bronchial crowding   and areas of groundglass attenuation and linear density laterally, unchanged.    Mosaic attenuation in the left apex suggestive of air trapping, unchanged.  No   appreciable mass or acute air space disease.    Pleural space:  No significant effusion.  No pneumothorax.    Heart:  Coronary calcifications.  No significant pericardial effusion.    Mediastinum: Small hiatal hernia.    Bones/joints:  No acute fracture.  No dislocation.    Soft tissues:  Unremarkable.    Lymph nodes:  Calcified prevascular/AP window lymph nodes, unchanged.      Impression:         No definite acute findings.  Bibasilar scarring with partial atelectasis in   the left lower lobe, unchanged.  Decreased areas of groundglass attenuation in   the right midlung field may represent postinflammatory change.    THIS DOCUMENT HAS BEEN ELECTRONICALLY SIGNED BY CRISTINA HUTTON MD    CT Abdomen Pelvis With Contrast [[id number]] Collected:  03/06/18 0258     Updated:  03/06/18 0444    Narrative:       EXAM:    CT Abdomen and Pelvis With Intravenous Contrast    CLINICAL HISTORY:    89 years old, female; Acidosis; Pressure ulcer of right buttock, stage 1;   Unspecified abdominal pain; Other: Pelvic; Additional info: Abdominal pain,   unspecified    TECHNIQUE:    Axial computed tomography images of the abdomen and pelvis with intravenous   contrast.  All CT scans at this facility use one or more dose reduction   techniques, viz.: automated exposure control; ma/kV adjustment per patient size   (including  targeted exams where dose is matched to indication; i.e. head); or   iterative reconstruction technique.    Coronal reformatted images were created and reviewed.    CONTRAST:    70 mL of isovue 370 administered intravenously.    COMPARISON:    CT ABDOMEN PELVIS WO CONTRAST 2018-02-28 23:14    FINDINGS:    Lower thorax:  Bibasilar scarring with partial atelectasis in the left lower   lobe, unchanged.     ABDOMEN:    Liver:  Normal.    Gallbladder and bile ducts:  Unremarkable.    Pancreas:  Normal.    Spleen:  Normal.    Adrenals:  Normal.    Kidneys and ureters:  Normal.    Stomach and bowel:  Sigmoid diverticulosis.  Otherwise unremarkable.  No   focal inflammatory changes or evidence of obstruction.    Appendix:  No findings to suggest acute appendicitis.     PELVIS:    Bladder:  Distended.  Unremarkable.    Reproductive:  Hysterectomy.     ABDOMEN and PELVIS:    Intraperitoneal space:  No free air.  No significant fluid collection.    Bones/joints: No acute findings.    Soft tissues:  Apparent perianal/perineal soft tissue thickening may be due   to crowding of structures, similar in appearance to previous study.  Bandlike   strandy density in the gluteal subcutaneous soft tissues extending toward the   inferior aspect of the perianal region on the right, unchanged.  No organized   fluid collection.  Short segment small bowel in a right inguinal hernia without   evidence of obstruction, slightly more prominent.    Vasculature:  Dense atherosclerotic calcifications.    Lymph nodes:  Unremarkable.  No enlarged lymph nodes.      Impression:         No acute findings.    THIS DOCUMENT HAS BEEN ELECTRONICALLY SIGNED BY CRISTINA HUTTON MD        Discharge Details      Ana Cleary   Home Medication Instructions UMA:[id number]    Printed on:03/09/18 1006   Medication Information                      acetaminophen (TYLENOL) 325 MG tablet  Take 2 tablets by mouth Every 4 (Four) Hours As Needed for Mild Pain .              amLODIPine (NORVASC) 5 MG tablet  Take 1 tablet by mouth Daily.             Ascorbic Acid (VITAMIN C PO)  Take 1,000 mg by mouth Daily.             aspirin 81 MG EC tablet  Take 81 mg by mouth Daily.             budesonide-formoterol (SYMBICORT) 160-4.5 MCG/ACT inhaler  Inhale 2 puffs 2 (Two) Times a Day.             carbidopa-levodopa (SINEMET)  MG per tablet  Take 2 tablets by mouth Daily. 2 tablets daily at 1700             carbidopa-levodopa (SINEMET)  MG per tablet  Take 1 tablet by mouth 2 (Two) Times a Day. 1 tablet daily at 0800 and 1200 (And 2 tabs at 1700)             castor oil-balsam peru (VENELEX) ointment  Apply to left gluteal pressure injury twice daily             Cranberry 450 MG capsule  Take 450 mg by mouth Daily.             donepezil (ARICEPT) 10 MG tablet  Take 10 mg by mouth Every Night.             furosemide (LASIX) 20 MG tablet  Take 1 tablet by mouth Daily.             ipratropium (ATROVENT) 0.02 % nebulizer solution  Take 500 mcg by nebulization 3 (Three) Times a Day.             lisinopril (PRINIVIL,ZESTRIL) 10 MG tablet  @@ TAKE ONE TABLET BY MOUTH DAILY             magnesium hydroxide (MILK OF MAGNESIA) 2400 MG/10ML suspension suspension  Take 10 mL by mouth Daily As Needed (if no BM documented in 48hrs).             magnesium hydroxide (MILK OF MAGNESIA) 400 MG/5ML suspension  Take 5-10 mL by mouth Daily As Needed for Constipation.             ondansetron (ZOFRAN) 4 MG tablet  Take 1 tablet by mouth Every 6 (Six) Hours As Needed for Nausea or Vomiting.             polyethylene glycol (MIRALAX) pack packet  Take 17 g by mouth 2 (Two) Times a Day.             potassium chloride (K-DUR) 10 MEQ CR tablet  Take 10 mEq by mouth Every Other Day.             sennosides-docusate sodium (SENOKOT-S) 8.6-50 MG tablet  Take 2 tablets by mouth 2 (Two) Times a Day.             sertraline (ZOLOFT) 50 MG tablet  Take 50 mg by mouth Daily.               Discharge Disposition:  Skilled  Nursing Facility (DC - External)    Discharge Diet:  Diet Instructions     Diet: Regular; Thin       Discharge Diet:  Regular   Fluid Consistency:  Thin               Discharge Activity:   Activity Instructions     Activity as Tolerated                   No future appointments.    Additional Instructions for the Follow-ups that You Need to Schedule     Discharge Follow-up with PCP    As directed    Follow Up Details:  Follow up with facility provider in 2-3 days                   Electronically signed by Lisa Perez PA-C, 03/09/18, 10:05 AM.

## 2018-03-31 ENCOUNTER — LAB REQUISITION (OUTPATIENT)
Dept: LAB | Facility: HOSPITAL | Age: 83
End: 2018-03-31

## 2018-03-31 DIAGNOSIS — Z00.00 ROUTINE GENERAL MEDICAL EXAMINATION AT A HEALTH CARE FACILITY: ICD-10-CM

## 2018-03-31 LAB
BACTERIA UR QL AUTO: ABNORMAL /HPF
BILIRUB UR QL STRIP: NEGATIVE
CLARITY UR: ABNORMAL
COLOR UR: YELLOW
GLUCOSE UR STRIP-MCNC: NEGATIVE MG/DL
HGB UR QL STRIP.AUTO: ABNORMAL
HYALINE CASTS UR QL AUTO: ABNORMAL /LPF
KETONES UR QL STRIP: NEGATIVE
LEUKOCYTE ESTERASE UR QL STRIP.AUTO: ABNORMAL
NITRITE UR QL STRIP: NEGATIVE
PH UR STRIP.AUTO: 6 [PH] (ref 5–8)
PROT UR QL STRIP: ABNORMAL
RBC # UR: ABNORMAL /HPF
REF LAB TEST METHOD: ABNORMAL
SP GR UR STRIP: 1.02 (ref 1–1.03)
SQUAMOUS #/AREA URNS HPF: ABNORMAL /HPF
UROBILINOGEN UR QL STRIP: ABNORMAL
WBC UR QL AUTO: ABNORMAL /HPF

## 2018-03-31 PROCEDURE — 81001 URINALYSIS AUTO W/SCOPE: CPT

## 2018-04-12 ENCOUNTER — LAB REQUISITION (OUTPATIENT)
Dept: LAB | Facility: HOSPITAL | Age: 83
End: 2018-04-12

## 2018-04-12 DIAGNOSIS — J15.8 PNEUMONIA DUE TO OTHER SPECIFIED BACTERIA (HCC): ICD-10-CM

## 2018-04-12 DIAGNOSIS — Z00.00 ROUTINE GENERAL MEDICAL EXAMINATION AT A HEALTH CARE FACILITY: ICD-10-CM

## 2018-04-12 LAB
ALBUMIN SERPL-MCNC: 3.8 G/DL (ref 3.2–4.8)
ALBUMIN/GLOB SERPL: 1.2 G/DL (ref 1.5–2.5)
ALP SERPL-CCNC: 73 U/L (ref 25–100)
ALT SERPL W P-5'-P-CCNC: 11 U/L (ref 7–40)
ANION GAP SERPL CALCULATED.3IONS-SCNC: 18 MMOL/L (ref 3–11)
AST SERPL-CCNC: 15 U/L (ref 0–33)
BASOPHILS # BLD AUTO: 0.04 10*3/MM3 (ref 0–0.2)
BASOPHILS NFR BLD AUTO: 0.1 % (ref 0–1)
BILIRUB SERPL-MCNC: 0.7 MG/DL (ref 0.3–1.2)
BUN BLD-MCNC: 32 MG/DL (ref 9–23)
BUN/CREAT SERPL: 18.8 (ref 7–25)
CALCIUM SPEC-SCNC: 8.6 MG/DL (ref 8.7–10.4)
CHLORIDE SERPL-SCNC: 100 MMOL/L (ref 99–109)
CO2 SERPL-SCNC: 21 MMOL/L (ref 20–31)
CREAT BLD-MCNC: 1.7 MG/DL (ref 0.6–1.3)
DEPRECATED RDW RBC AUTO: 56.7 FL (ref 37–54)
EOSINOPHIL # BLD AUTO: 0.01 10*3/MM3 (ref 0–0.3)
EOSINOPHIL NFR BLD AUTO: 0 % (ref 0–3)
ERYTHROCYTE [DISTWIDTH] IN BLOOD BY AUTOMATED COUNT: 16.6 % (ref 11.3–14.5)
GFR SERPL CREATININE-BSD FRML MDRD: 28 ML/MIN/1.73
GLOBULIN UR ELPH-MCNC: 3.1 GM/DL
GLUCOSE BLD-MCNC: 142 MG/DL (ref 70–100)
HCT VFR BLD AUTO: 35.8 % (ref 34.5–44)
HGB BLD-MCNC: 10.9 G/DL (ref 11.5–15.5)
IMM GRANULOCYTES # BLD: 0.19 10*3/MM3 (ref 0–0.03)
IMM GRANULOCYTES NFR BLD: 0.6 % (ref 0–0.6)
LYMPHOCYTES # BLD AUTO: 2.44 10*3/MM3 (ref 0.6–4.8)
LYMPHOCYTES NFR BLD AUTO: 8.2 % (ref 24–44)
MCH RBC QN AUTO: 28.5 PG (ref 27–31)
MCHC RBC AUTO-ENTMCNC: 30.4 G/DL (ref 32–36)
MCV RBC AUTO: 93.7 FL (ref 80–99)
MONOCYTES # BLD AUTO: 1.86 10*3/MM3 (ref 0–1)
MONOCYTES NFR BLD AUTO: 6.2 % (ref 0–12)
NEUTROPHILS # BLD AUTO: 25.34 10*3/MM3 (ref 1.5–8.3)
NEUTROPHILS NFR BLD AUTO: 84.9 % (ref 41–71)
PLATELET # BLD AUTO: 304 10*3/MM3 (ref 150–450)
PMV BLD AUTO: 12.4 FL (ref 6–12)
POTASSIUM BLD-SCNC: 4.8 MMOL/L (ref 3.5–5.5)
PROT SERPL-MCNC: 6.9 G/DL (ref 5.7–8.2)
RBC # BLD AUTO: 3.82 10*6/MM3 (ref 3.89–5.14)
SODIUM BLD-SCNC: 139 MMOL/L (ref 132–146)
WBC NRBC COR # BLD: 29.88 10*3/MM3 (ref 3.5–10.8)

## 2018-04-12 PROCEDURE — 85025 COMPLETE CBC W/AUTO DIFF WBC: CPT | Performed by: INTERNAL MEDICINE

## 2018-04-12 PROCEDURE — 80053 COMPREHEN METABOLIC PANEL: CPT | Performed by: INTERNAL MEDICINE

## 2021-11-26 NOTE — DISCHARGE INSTRUCTIONS
Pt will need follow up appointment for sinogram in 2 weeks.    Rest with no vigorous physical activity for the next several days    Take 2 of the Lasix tablets and 1 potassium tablet on arrival home today, then take one Lasix tablet every morning, and 1 potassium tablet 3 morning    Continue your other medications as usual    Follow-up with the [facility] heart and valve congestive heart failure clinic this week in the office.  They should call you by noon on Monday to your power of 's number at [phone].  If they do not call by noon call the CHF clinic number as provided    Follow-up with the latter staff for reevaluation on Monday for follow-up of the fall, head contusion, as well as the shortness of breath and congestive heart failure, to make sure you have adequate diuresis, but did not become dehydrated.  Recheck potassium later on this week at [facility]    Immediate return if you have acute, emergent, or progressive symptoms as discussed